# Patient Record
Sex: MALE | Race: WHITE | NOT HISPANIC OR LATINO | URBAN - METROPOLITAN AREA
[De-identification: names, ages, dates, MRNs, and addresses within clinical notes are randomized per-mention and may not be internally consistent; named-entity substitution may affect disease eponyms.]

---

## 2020-04-02 DIAGNOSIS — I15.9 SECONDARY HYPERTENSION: Primary | ICD-10-CM

## 2020-04-02 RX ORDER — HYDROCHLOROTHIAZIDE 25 MG/1
25 TABLET ORAL DAILY
Qty: 30 TABLET | Refills: 5 | Status: SHIPPED | OUTPATIENT
Start: 2020-04-02 | End: 2020-06-02 | Stop reason: SDUPTHER

## 2020-04-02 RX ORDER — HYDROCHLOROTHIAZIDE 25 MG/1
25 TABLET ORAL DAILY
COMMUNITY
End: 2020-04-02 | Stop reason: SDUPTHER

## 2020-04-06 ENCOUNTER — TELEPHONE (OUTPATIENT)
Dept: FAMILY MEDICINE CLINIC | Facility: CLINIC | Age: 70
End: 2020-04-06

## 2020-06-02 ENCOUNTER — TELEPHONE (OUTPATIENT)
Dept: FAMILY MEDICINE CLINIC | Facility: CLINIC | Age: 70
End: 2020-06-02

## 2020-06-02 DIAGNOSIS — I15.9 SECONDARY HYPERTENSION: ICD-10-CM

## 2020-06-02 DIAGNOSIS — E78.5 HYPERLIPIDEMIA, UNSPECIFIED HYPERLIPIDEMIA TYPE: Primary | ICD-10-CM

## 2020-06-02 RX ORDER — SIMVASTATIN 80 MG
TABLET ORAL
COMMUNITY
Start: 2020-05-06 | End: 2020-06-02 | Stop reason: SDUPTHER

## 2020-06-02 RX ORDER — HYDROCHLOROTHIAZIDE 25 MG/1
25 TABLET ORAL DAILY
Qty: 30 TABLET | Refills: 3 | Status: SHIPPED | OUTPATIENT
Start: 2020-06-02 | End: 2020-10-05 | Stop reason: SDUPTHER

## 2020-06-02 RX ORDER — SIMVASTATIN 80 MG
80 TABLET ORAL
Qty: 30 TABLET | Refills: 3 | Status: SHIPPED | OUTPATIENT
Start: 2020-06-02 | End: 2020-10-05 | Stop reason: SDUPTHER

## 2020-06-16 DIAGNOSIS — I10 ESSENTIAL HYPERTENSION: Primary | ICD-10-CM

## 2020-06-16 RX ORDER — AMLODIPINE BESYLATE 10 MG/1
10 TABLET ORAL DAILY
Qty: 30 TABLET | Refills: 3 | Status: SHIPPED | OUTPATIENT
Start: 2020-06-16 | End: 2020-10-05 | Stop reason: SDUPTHER

## 2020-06-16 RX ORDER — AMLODIPINE BESYLATE 10 MG/1
1 TABLET ORAL DAILY
COMMUNITY
Start: 2020-05-19 | End: 2020-06-16 | Stop reason: SDUPTHER

## 2020-06-16 RX ORDER — BENAZEPRIL HYDROCHLORIDE 40 MG/1
1 TABLET, FILM COATED ORAL DAILY
COMMUNITY
Start: 2020-05-19 | End: 2020-06-16 | Stop reason: SDUPTHER

## 2020-06-16 RX ORDER — BENAZEPRIL HYDROCHLORIDE 40 MG/1
40 TABLET, FILM COATED ORAL DAILY
Qty: 30 TABLET | Refills: 3 | Status: SHIPPED | OUTPATIENT
Start: 2020-06-16 | End: 2020-11-23 | Stop reason: SDUPTHER

## 2020-10-05 ENCOUNTER — TELEPHONE (OUTPATIENT)
Dept: FAMILY MEDICINE CLINIC | Facility: CLINIC | Age: 70
End: 2020-10-05

## 2020-10-05 DIAGNOSIS — E78.5 HYPERLIPIDEMIA, UNSPECIFIED HYPERLIPIDEMIA TYPE: ICD-10-CM

## 2020-10-05 DIAGNOSIS — I15.9 SECONDARY HYPERTENSION: ICD-10-CM

## 2020-10-05 DIAGNOSIS — I10 ESSENTIAL HYPERTENSION: ICD-10-CM

## 2020-10-05 RX ORDER — HYDROCHLOROTHIAZIDE 25 MG/1
25 TABLET ORAL DAILY
Qty: 30 TABLET | Refills: 5 | Status: SHIPPED | OUTPATIENT
Start: 2020-10-05 | End: 2021-05-04 | Stop reason: SDUPTHER

## 2020-10-05 RX ORDER — AMLODIPINE BESYLATE 10 MG/1
10 TABLET ORAL DAILY
Qty: 30 TABLET | Refills: 5 | Status: SHIPPED | OUTPATIENT
Start: 2020-10-05 | End: 2021-05-04 | Stop reason: SDUPTHER

## 2020-10-05 RX ORDER — SIMVASTATIN 80 MG
80 TABLET ORAL
Qty: 30 TABLET | Refills: 5 | Status: SHIPPED | OUTPATIENT
Start: 2020-10-05 | End: 2021-05-04 | Stop reason: SDUPTHER

## 2020-11-23 ENCOUNTER — TELEPHONE (OUTPATIENT)
Dept: FAMILY MEDICINE CLINIC | Facility: CLINIC | Age: 70
End: 2020-11-23

## 2020-11-23 DIAGNOSIS — I10 ESSENTIAL HYPERTENSION: ICD-10-CM

## 2020-11-23 DIAGNOSIS — E11.9 DIABETES MELLITUS WITHOUT COMPLICATION (HCC): Primary | ICD-10-CM

## 2020-11-23 RX ORDER — BENAZEPRIL HYDROCHLORIDE 40 MG/1
40 TABLET, FILM COATED ORAL DAILY
Qty: 30 TABLET | Refills: 3 | Status: SHIPPED | OUTPATIENT
Start: 2020-11-23 | End: 2021-04-19 | Stop reason: SDUPTHER

## 2021-01-23 PROBLEM — E11.9 DIABETES MELLITUS WITHOUT COMPLICATION (HCC): Status: ACTIVE | Noted: 2021-01-23

## 2021-01-23 PROBLEM — F41.1 GAD (GENERALIZED ANXIETY DISORDER): Status: ACTIVE | Noted: 2021-01-23

## 2021-01-23 PROBLEM — G47.33 OSA (OBSTRUCTIVE SLEEP APNEA): Status: ACTIVE | Noted: 2021-01-23

## 2021-01-23 PROBLEM — E66.9 OBESITY (BMI 30-39.9): Status: ACTIVE | Noted: 2021-01-23

## 2021-01-23 PROBLEM — E78.00 HYPERCHOLESTEROLEMIA: Status: ACTIVE | Noted: 2021-01-23

## 2021-01-23 PROBLEM — I10 ESSENTIAL HYPERTENSION: Status: ACTIVE | Noted: 2021-01-23

## 2021-01-29 ENCOUNTER — TELEMEDICINE (OUTPATIENT)
Dept: FAMILY MEDICINE CLINIC | Facility: CLINIC | Age: 71
End: 2021-01-29
Payer: MEDICARE

## 2021-01-29 VITALS — WEIGHT: 255 LBS | BODY MASS INDEX: 33.8 KG/M2 | HEIGHT: 73 IN

## 2021-01-29 DIAGNOSIS — I10 ESSENTIAL HYPERTENSION: Primary | ICD-10-CM

## 2021-01-29 DIAGNOSIS — E11.9 DIABETES MELLITUS WITHOUT COMPLICATION (HCC): ICD-10-CM

## 2021-01-29 DIAGNOSIS — E66.9 OBESITY (BMI 30-39.9): ICD-10-CM

## 2021-01-29 DIAGNOSIS — E78.00 HYPERCHOLESTEROLEMIA: ICD-10-CM

## 2021-01-29 DIAGNOSIS — Z12.11 SCREENING FOR COLON CANCER: ICD-10-CM

## 2021-01-29 DIAGNOSIS — Z12.5 PROSTATE CANCER SCREENING: ICD-10-CM

## 2021-01-29 DIAGNOSIS — F41.1 GAD (GENERALIZED ANXIETY DISORDER): ICD-10-CM

## 2021-01-29 DIAGNOSIS — Z00.00 MEDICARE ANNUAL WELLNESS VISIT, SUBSEQUENT: ICD-10-CM

## 2021-01-29 PROCEDURE — 99214 OFFICE O/P EST MOD 30 MIN: CPT | Performed by: FAMILY MEDICINE

## 2021-01-29 PROCEDURE — 3008F BODY MASS INDEX DOCD: CPT | Performed by: FAMILY MEDICINE

## 2021-01-29 PROCEDURE — 1101F PT FALLS ASSESS-DOCD LE1/YR: CPT | Performed by: FAMILY MEDICINE

## 2021-01-29 PROCEDURE — 1160F RVW MEDS BY RX/DR IN RCRD: CPT | Performed by: FAMILY MEDICINE

## 2021-01-29 PROCEDURE — 1125F AMNT PAIN NOTED PAIN PRSNT: CPT | Performed by: FAMILY MEDICINE

## 2021-01-29 PROCEDURE — 3725F SCREEN DEPRESSION PERFORMED: CPT | Performed by: FAMILY MEDICINE

## 2021-01-29 PROCEDURE — G0439 PPPS, SUBSEQ VISIT: HCPCS | Performed by: FAMILY MEDICINE

## 2021-01-29 PROCEDURE — 1170F FXNL STATUS ASSESSED: CPT | Performed by: FAMILY MEDICINE

## 2021-01-29 PROCEDURE — 1123F ACP DISCUSS/DSCN MKR DOCD: CPT | Performed by: FAMILY MEDICINE

## 2021-01-29 PROCEDURE — 1036F TOBACCO NON-USER: CPT | Performed by: FAMILY MEDICINE

## 2021-01-29 PROCEDURE — 3288F FALL RISK ASSESSMENT DOCD: CPT | Performed by: FAMILY MEDICINE

## 2021-01-29 NOTE — PROGRESS NOTES
Virtual Regular Visit      Assessment/Plan:    Problem List Items Addressed This Visit        Endocrine    Diabetes mellitus without complication ([address] )     Stressed low carb diet, exercise, and wt loss  Cont med and will recheck labs  Pt had eye exam in Dec 2020 by Dr Jose J Harrington  No numbness in feet  No results found for: HGBA1C         Relevant Orders    Hemoglobin A1C       Cardiovascular and Mediastinum    Essential hypertension - Primary     Pt needs to check BP on regular basis  Inc exercise and cont low Na diet  Cont current meds for now  Relevant Orders    Comprehensive metabolic panel    CBC and differential       Other    Obesity (BMI 30-39  9)     Discussed inc exercise and smaller portions to lose wt  Hypercholesterolemia     Recheck lipids and LFTs  Cont simvastatin 80 mg qhs  Relevant Orders    Lipid panel    Comprehensive metabolic panel    MARYAM (generalized anxiety disorder)     Stress level ok  Not on meds at present  Other Visit Diagnoses     Prostate cancer screening        Relevant Orders    PSA, Total Screen    Screening for colon cancer        Relevant Orders    Cologuard          BMI Counseling: Body mass index is 33 64 kg/m²  The BMI is above normal  Nutrition recommendations include decreasing portion sizes, encouraging healthy choices of fruits and vegetables, consuming healthier snacks and moderation in carbohydrate intake  Exercise recommendations include exercising 3-5 times per week  No pharmacotherapy was ordered  Reason for visit is   Chief Complaint   Patient presents with    Hypertension    Diabetes    Medicare Wellness Visit    Virtual Regular Visit        Encounter provider Aysha Hernandez MD    Provider located at [address] [place] [id number] [phone]      Recent Visits  No visits were found meeting these conditions     Showing recent visits within past 7 days and meeting all other requirements     Today's Visits  Date Type Provider Dept   01/29/21 William Bennett MD [address] [address] today's visits and meeting all other requirements     Future Appointments  No visits were found meeting these conditions  Showing future appointments within next 150 days and meeting all other requirements        The patient was identified by name and date of birth  Ofe Guido was informed that this is a telemedicine visit and that the visit is being conducted through [facility] and patient was informed that this is a secure, HIPAA-compliant platform  He agrees to proceed     My office door was closed  No one else was in the room  He acknowledged consent and understanding of privacy and security of the video platform  The patient has agreed to participate and understands they can discontinue the visit at any time  Patient is aware this is a billable service  Subjective  Ofe Guido is a 69 y o  male    Pt for f/u HTN, HL, DM, MARYAM, Obesity  Pt doing well  No cp/sob  No headaches  No abd pain  No regular exercise  Tries to watch diet  Not checking BP or sugars at home  Last alberto exam was in Dec with Dr Kiki Soto  Sees him every 6 mths  No numbness in feet  No new c/o          Past Medical History:   Diagnosis Date    Diabetes mellitus ([address] )     Hypertension     Low back pain     Sleep disorder        Past Surgical History:   Procedure Laterality Date    TONSILLECTOMY         Current Outpatient Medications   Medication Sig Dispense Refill    amLODIPine (NORVASC) 10 mg tablet Take 1 tablet (10 mg total) by mouth daily 30 tablet 5    benazepril (LOTENSIN) 40 MG tablet Take 1 tablet (40 mg total) by mouth daily 30 tablet 3    hydrochlorothiazide (HYDRODIURIL) 25 mg tablet Take 1 tablet (25 mg total) by mouth daily 30 tablet 5    metFORMIN (GLUCOPHAGE) 500 mg tablet Take 2 tablets (1,000 mg total) by mouth 2 (two) times a day with meals 120 tablet 3    simvastatin (ZOCOR) 80 mg tablet Take 1 tablet (80 mg total) by mouth daily at bedtime 30 tablet 5     No current facility-administered medications for this visit  No Known Allergies    Review of Systems   Constitutional: Negative for activity change, appetite change, chills, fatigue, fever and unexpected weight change  HENT: Negative for congestion, ear pain, postnasal drip, rhinorrhea, sinus pressure, sinus pain, sore throat and trouble swallowing  Eyes: Negative for visual disturbance  Respiratory: Negative for cough, chest tightness, shortness of breath and wheezing  Cardiovascular: Negative for chest pain and palpitations  Gastrointestinal: Negative for abdominal pain, constipation, diarrhea, nausea and vomiting  Endocrine: Negative for polydipsia, polyphagia and polyuria  Genitourinary: Negative for frequency  Musculoskeletal: Negative for arthralgias  Skin: Negative for rash and wound  Neurological: Negative for dizziness, tremors, light-headedness, numbness and headaches  Psychiatric/Behavioral: Negative for agitation, decreased concentration, dysphoric mood, self-injury, sleep disturbance and suicidal ideas  The patient is not nervous/anxious  Video Exam    Vitals:    01/29/21 0902   Weight: 116 kg (255 lb)   Height: 6' 1" (1 854 m)       Physical Exam  Vitals signs and nursing note reviewed  Constitutional:       General: He is not in acute distress  Appearance: Normal appearance  He is well-developed  He is obese  He is not ill-appearing or toxic-appearing  HENT:      Head: Normocephalic and atraumatic  Nose: No congestion or rhinorrhea  Cardiovascular:      Rate and Rhythm: Normal rate  Pulmonary:      Effort: Pulmonary effort is normal  No respiratory distress  Neurological:      Mental Status: He is alert and oriented to person, place, and time     Psychiatric:         Mood and Affect: Mood normal          Behavior: Behavior normal          Thought Content: Thought content normal          Judgment: Judgment normal           I spent 20 minutes directly with the patient during this visit      VIRTUAL VISIT Juhi Willis acknowledges that he has consented to an online visit or consultation  He understands that the online visit is based solely on information provided by him, and that, in the absence of a face-to-face physical evaluation by the physician, the diagnosis he receives is both limited and provisional in terms of accuracy and completeness  This is not intended to replace a full medical face-to-face evaluation by the physician  Roberta Post understands and accepts these terms

## 2021-01-29 NOTE — PATIENT INSTRUCTIONS

## 2021-01-29 NOTE — ASSESSMENT & PLAN NOTE
Pt needs to check BP on regular basis  Inc exercise and cont low Na diet  Cont current meds for now

## 2021-01-29 NOTE — PROGRESS NOTES
BMI Counseling: Body mass index is 33 64 kg/m²  The BMI is above normal  Nutrition recommendations include decreasing portion sizes, encouraging healthy choices of fruits and vegetables, consuming healthier snacks and moderation in carbohydrate intake  Exercise recommendations include exercising 3-5 times per week  No pharmacotherapy was ordered  Assessment/Plan:         Problem List Items Addressed This Visit        Endocrine    Diabetes mellitus without complication ([facility] [address] )     Stressed low carb diet, exercise, and wt loss  Cont med and will recheck labs  Pt had eye exam in Dec 2020 by Dr Muna Molina  No numbness in feet  No results found for: HGBA1C         Relevant Orders    Hemoglobin A1C       Cardiovascular and Mediastinum    Essential hypertension - Primary     Pt needs to check BP on regular basis  Inc exercise and cont low Na diet  Cont current meds for now  Relevant Orders    Comprehensive metabolic panel    CBC and differential       Other    Obesity (BMI 30-39  9)     Discussed inc exercise and smaller portions to lose wt  Medicare annual wellness visit, subsequent     Wellness exam done  Hypercholesterolemia     Recheck lipids and LFTs  Cont simvastatin 80 mg qhs  Relevant Orders    Lipid panel    Comprehensive metabolic panel    MARYAM (generalized anxiety disorder)     Stress level ok  Not on meds at present  Other Visit Diagnoses     Prostate cancer screening        Relevant Orders    PSA, Total Screen    Screening for colon cancer        Relevant Orders    Cologuard            Subjective:      Patient ID: Eddie Durham is a 69 y o  male  Pt for f/u HTN, HL, DM, MARYAM, Obesity  Pt doing well  No cp/sob  No headaches  No abd pain  No regular exercise  Pt not checking sugars or BP at home  Tries to watch carb intake  Had eye exam by Dr Muna Molina in Dec 2020 and was ok  No numbness of feet  Mood has been ok and wt is about the same  No new c/o   Due for labs and wellness exam       The following portions of the patient's history were reviewed and updated as appropriate:   Past Medical History:  He has a past medical history of Diabetes mellitus ([address] ), Hypertension, Low back pain, and Sleep disorder  ,  _______________________________________________________________________  Medical Problems:  does not have any pertinent problems on file ,  _______________________________________________________________________  Past Surgical History:   has a past surgical history that includes Tonsillectomy  ,  _______________________________________________________________________  Family History:  family history includes No Known Problems in his father and mother ,  _______________________________________________________________________  Social History:   reports that he has never smoked  He has never used smokeless tobacco  He reports previous alcohol use  He reports that he does not use drugs  ,  _______________________________________________________________________  Allergies:  has No Known Allergies     _______________________________________________________________________  Current Outpatient Medications   Medication Sig Dispense Refill    amLODIPine (NORVASC) 10 mg tablet Take 1 tablet (10 mg total) by mouth daily 30 tablet 5    benazepril (LOTENSIN) 40 MG tablet Take 1 tablet (40 mg total) by mouth daily 30 tablet 3    hydrochlorothiazide (HYDRODIURIL) 25 mg tablet Take 1 tablet (25 mg total) by mouth daily 30 tablet 5    metFORMIN (GLUCOPHAGE) 500 mg tablet Take 2 tablets (1,000 mg total) by mouth 2 (two) times a day with meals 120 tablet 3    simvastatin (ZOCOR) 80 mg tablet Take 1 tablet (80 mg total) by mouth daily at bedtime 30 tablet 5     No current facility-administered medications for this visit       _______________________________________________________________________  Review of Systems   Constitutional: Negative for activity change, appetite change, fatigue and unexpected weight change  Eyes: Negative for visual disturbance  Respiratory: Negative for chest tightness and shortness of breath  Cardiovascular: Negative for chest pain and palpitations  Gastrointestinal: Negative for abdominal pain, constipation, diarrhea, nausea and vomiting  Endocrine: Negative for polydipsia, polyphagia and polyuria  Genitourinary: Negative for frequency  Musculoskeletal: Negative for arthralgias  Skin: Negative for rash and wound  Neurological: Negative for dizziness, tremors, light-headedness, numbness and headaches  Psychiatric/Behavioral: Negative for agitation, decreased concentration, dysphoric mood, self-injury, sleep disturbance and suicidal ideas  The patient is not nervous/anxious  Objective:  Vitals:    01/29/21 0902   Weight: 116 kg (255 lb)   Height: 6' 1" (1 854 m)     Body mass index is 33 64 kg/m²  Physical Exam  Vitals signs and nursing note reviewed  Constitutional:       General: He is not in acute distress  Appearance: Normal appearance  He is well-developed  He is obese  He is not ill-appearing or toxic-appearing  HENT:      Head: Normocephalic and atraumatic  Cardiovascular:      Rate and Rhythm: Normal rate  Pulmonary:      Effort: Pulmonary effort is normal  No respiratory distress  Neurological:      Mental Status: He is alert and oriented to person, place, and time  Psychiatric:         Mood and Affect: Mood normal          Behavior: Behavior normal          Thought Content:  Thought content normal          Judgment: Judgment normal

## 2021-01-29 NOTE — PROGRESS NOTES
Assessment and Plan:     Problem List Items Addressed This Visit        Endocrine    Diabetes mellitus without complication ([address] )     Stressed low carb diet, exercise, and wt loss  Cont med and will recheck labs  Pt had eye exam in Dec 2020 by Dr Wilmer Carlton  No numbness in feet  No results found for: HGBA1C         Relevant Orders    Hemoglobin A1C       Cardiovascular and Mediastinum    Essential hypertension - Primary     Pt needs to check BP on regular basis  Inc exercise and cont low Na diet  Cont current meds for now  Relevant Orders    Comprehensive metabolic panel    CBC and differential       Other    Obesity (BMI 30-39  9)     Discussed inc exercise and smaller portions to lose wt  Medicare annual wellness visit, subsequent     Wellness exam done  Hypercholesterolemia     Recheck lipids and LFTs  Cont simvastatin 80 mg qhs  Relevant Orders    Lipid panel    Comprehensive metabolic panel    MARYAM (generalized anxiety disorder)     Stress level ok  Not on meds at present  Other Visit Diagnoses     Prostate cancer screening        Relevant Orders    PSA, Total Screen    Screening for colon cancer        Relevant Orders    Cologuard        BMI Counseling: Body mass index is 33 64 kg/m²  The BMI is above normal  Nutrition recommendations include decreasing portion sizes, encouraging healthy choices of fruits and vegetables, consuming healthier snacks and moderation in carbohydrate intake  Exercise recommendations include exercising 3-5 times per week  No pharmacotherapy was ordered  Preventive health issues were discussed with patient, and age appropriate screening tests were ordered as noted in patient's After Visit Summary  Personalized health advice and appropriate referrals for health education or preventive services given if needed, as noted in patient's After Visit Summary       History of Present Illness:     Patient presents for Medicare Annual Wellness visit    Patient Care Team:  Alexandra Morales MD as PCP - General (Family Medicine)     Problem List:     Patient Active Problem List   Diagnosis    Essential hypertension    Diabetes mellitus without complication [address] [address] )    Hypercholesterolemia    MARYAM (generalized anxiety disorder)    RICHARD (obstructive sleep apnea)    Obesity (BMI 30-39  9)    Medicare annual wellness visit, subsequent      Past Medical and Surgical History:     Past Medical History:   Diagnosis Date    Diabetes mellitus ([facility] [address] )     Hypertension     Low back pain     Sleep disorder      Past Surgical History:   Procedure Laterality Date    TONSILLECTOMY        Family History:     Family History   Problem Relation Age of Onset    No Known Problems Mother     No Known Problems Father       Social History:        Social History     Socioeconomic History    Marital status: /Civil Union     Spouse name: None    Number of children: None    Years of education: None    Highest education level: None   Occupational History    None   Social Needs    Financial resource strain: None    Food insecurity     Worry: None     Inability: None    Transportation needs     Medical: None     Non-medical: None   Tobacco Use    Smoking status: Never Smoker    Smokeless tobacco: Never Used   Substance and Sexual Activity    Alcohol use: Not Currently    Drug use: Never    Sexual activity: None   Lifestyle    Physical activity     Days per week: None     Minutes per session: None    Stress: None   Relationships    Social connections     Talks on phone: None     Gets together: None     Attends Gnosticist service: None     Active member of club or organization: None     Attends meetings of clubs or organizations: None     Relationship status: None    Intimate partner violence     Fear of current or ex partner: None     Emotionally abused: None     Physically abused: None     Forced sexual activity: None   Other Topics Concern    None Social History Narrative    Most recent tobacco use screenin2019      Medications and Allergies:     Current Outpatient Medications   Medication Sig Dispense Refill    amLODIPine (NORVASC) 10 mg tablet Take 1 tablet (10 mg total) by mouth daily 30 tablet 5    benazepril (LOTENSIN) 40 MG tablet Take 1 tablet (40 mg total) by mouth daily 30 tablet 3    hydrochlorothiazide (HYDRODIURIL) 25 mg tablet Take 1 tablet (25 mg total) by mouth daily 30 tablet 5    metFORMIN (GLUCOPHAGE) 500 mg tablet Take 2 tablets (1,000 mg total) by mouth 2 (two) times a day with meals 120 tablet 3    simvastatin (ZOCOR) 80 mg tablet Take 1 tablet (80 mg total) by mouth daily at bedtime 30 tablet 5     No current facility-administered medications for this visit  No Known Allergies   Immunizations:     Immunization History   Administered Date(s) Administered    Pneumococcal Conjugate 13-Valent 10/19/2015    Pneumococcal Polysaccharide PPV23 10/14/2014    Tdap 2012      Health Maintenance:         Topic Date Due    Hepatitis C Screening  1950    Colorectal Cancer Screening  1999         Topic Date Due    Influenza Vaccine (1) 2020      Medicare Health Risk Assessment:     Ht 6' 1" (1 854 m)   Wt 116 kg (255 lb)   BMI 33 64 kg/m²      [facility] is here for his Subsequent Wellness visit  Health Risk Assessment:   Patient rates overall health as good  Patient feels that their physical health rating is same  Eyesight was rated as same  Hearing was rated as same  Patient feels that their emotional and mental health rating is same  Pain experienced in the last 7 days has been some  Patient's pain rating has been 3/10  Patient states that he has experienced no weight loss or gain in last 6 months  Depression Screening:   PHQ-2 Score: 2      Fall Risk Screening:    In the past year, patient has experienced: no history of falling in past year      Home Safety:  Patient does not have trouble with stairs inside or outside of their home  Patient has working smoke alarms and has working carbon monoxide detector  Home safety hazards include: none  Nutrition:   Current diet is Diabetic  Medications:   Patient is not currently taking any over-the-counter supplements  Patient is able to manage medications  Activities of Daily Living (ADLs)/Instrumental Activities of Daily Living (IADLs):   Walk and transfer into and out of bed and chair?: Yes  Dress and groom yourself?: Yes    Bathe or shower yourself?: Yes    Feed yourself?  Yes  Do your laundry/housekeeping?: Yes  Manage your money, pay your bills and track your expenses?: Yes  Make your own meals?: Yes    Do your own shopping?: Yes    Previous Hospitalizations:   Any hospitalizations or ED visits within the last 12 months?: No      Advance Care Planning:   Living will: No    Durable POA for healthcare: No    Advanced directive: No      PREVENTIVE SCREENINGS      Cardiovascular Screening:    General: Screening Not Indicated and History Lipid Disorder      Diabetes Screening:     General: Screening Not Indicated and History Diabetes      Colorectal Cancer Screening:       Due for: Cologuard      Prostate Cancer Screening:      Due for: PSA      Osteoporosis Screening:    General: Screening Not Indicated      Abdominal Aortic Aneurysm (AAA) Screening:    Risk factors include: age between 73-67 yo        General: Screening Not Indicated      Lung Cancer Screening:     General: Screening Not Indicated      Hepatitis C Screening:    General: Risks and Benefits Discussed and Patient Declines    Hep C Screening Accepted: No       Miguelina Sotomayor MD

## 2021-01-29 NOTE — ASSESSMENT & PLAN NOTE
Stressed low carb diet, exercise, and wt loss  Cont med and will recheck labs  Pt had eye exam in Dec 2020 by Dr Williams Ganser  No numbness in feet       No results found for: HGBA1C

## 2021-04-19 ENCOUNTER — TELEPHONE (OUTPATIENT)
Dept: FAMILY MEDICINE CLINIC | Facility: CLINIC | Age: 71
End: 2021-04-19

## 2021-04-19 DIAGNOSIS — I10 ESSENTIAL HYPERTENSION: ICD-10-CM

## 2021-04-19 DIAGNOSIS — E11.9 DIABETES MELLITUS WITHOUT COMPLICATION (HCC): ICD-10-CM

## 2021-04-19 RX ORDER — BENAZEPRIL HYDROCHLORIDE 40 MG/1
40 TABLET, FILM COATED ORAL DAILY
Qty: 30 TABLET | Refills: 1 | Status: SHIPPED | OUTPATIENT
Start: 2021-04-19 | End: 2021-06-05

## 2021-04-19 NOTE — TELEPHONE ENCOUNTER
metFORMIN (GLUCOPHAGE) 500 mg tablet  Take 2 tablets (1,000 mg total) by mouth 2 (two) times a day with meals   benazepril (LOTENSIN) 40 MG tablet Take 1 tablet (40 mg total) by mouth daily QTY 30 both medications sent to [address] [place] [address]

## 2021-05-04 ENCOUNTER — TRANSCRIBE ORDERS (OUTPATIENT)
Dept: FAMILY MEDICINE CLINIC | Facility: CLINIC | Age: 71
End: 2021-05-04

## 2021-05-04 DIAGNOSIS — I15.9 SECONDARY HYPERTENSION: ICD-10-CM

## 2021-05-04 DIAGNOSIS — I10 ESSENTIAL HYPERTENSION: ICD-10-CM

## 2021-05-04 DIAGNOSIS — E78.5 HYPERLIPIDEMIA, UNSPECIFIED HYPERLIPIDEMIA TYPE: ICD-10-CM

## 2021-05-04 RX ORDER — AMLODIPINE BESYLATE 10 MG/1
10 TABLET ORAL DAILY
Qty: 30 TABLET | Refills: 2 | Status: SHIPPED | OUTPATIENT
Start: 2021-05-04 | End: 2021-07-25

## 2021-05-04 RX ORDER — SIMVASTATIN 80 MG
80 TABLET ORAL
Qty: 30 TABLET | Refills: 2 | Status: SHIPPED | OUTPATIENT
Start: 2021-05-04 | End: 2021-07-25

## 2021-05-04 RX ORDER — HYDROCHLOROTHIAZIDE 25 MG/1
25 TABLET ORAL DAILY
Qty: 30 TABLET | Refills: 2 | Status: SHIPPED | OUTPATIENT
Start: 2021-05-04 | End: 2021-07-25

## 2021-05-04 NOTE — TELEPHONE ENCOUNTER
Needs a refill for Simvastatin 80mg, 1x a day;  Amlodipine 10mg, 1x a day;   Hydrocholthiazide 25mg, 1x a day;    (30 day)    pharmacy - [facility] , [place]    Patients ph # [phone]

## 2021-06-05 DIAGNOSIS — E11.9 DIABETES MELLITUS WITHOUT COMPLICATION (HCC): ICD-10-CM

## 2021-06-05 DIAGNOSIS — I10 ESSENTIAL HYPERTENSION: ICD-10-CM

## 2021-06-05 RX ORDER — BENAZEPRIL HYDROCHLORIDE 40 MG/1
TABLET, FILM COATED ORAL
Qty: 30 TABLET | Refills: 1 | Status: SHIPPED | OUTPATIENT
Start: 2021-06-05 | End: 2021-06-21 | Stop reason: SDUPTHER

## 2021-06-21 DIAGNOSIS — E11.9 DIABETES MELLITUS WITHOUT COMPLICATION (HCC): ICD-10-CM

## 2021-06-21 DIAGNOSIS — I10 ESSENTIAL HYPERTENSION: ICD-10-CM

## 2021-06-21 RX ORDER — BENAZEPRIL HYDROCHLORIDE 40 MG/1
40 TABLET, FILM COATED ORAL DAILY
Qty: 30 TABLET | Refills: 1 | Status: SHIPPED | OUTPATIENT
Start: 2021-06-21 | End: 2021-08-19 | Stop reason: SDUPTHER

## 2021-08-19 DIAGNOSIS — E11.9 DIABETES MELLITUS WITHOUT COMPLICATION (HCC): ICD-10-CM

## 2021-08-19 DIAGNOSIS — I10 ESSENTIAL HYPERTENSION: ICD-10-CM

## 2021-08-19 RX ORDER — BENAZEPRIL HYDROCHLORIDE 40 MG/1
40 TABLET, FILM COATED ORAL DAILY
Qty: 30 TABLET | Refills: 1 | Status: SHIPPED | OUTPATIENT
Start: 2021-08-19 | End: 2021-08-30 | Stop reason: SDUPTHER

## 2021-08-30 ENCOUNTER — OFFICE VISIT (OUTPATIENT)
Dept: FAMILY MEDICINE CLINIC | Facility: CLINIC | Age: 71
End: 2021-08-30
Payer: MEDICARE

## 2021-08-30 VITALS
RESPIRATION RATE: 16 BRPM | OXYGEN SATURATION: 97 % | HEIGHT: 73 IN | BODY MASS INDEX: 32.2 KG/M2 | WEIGHT: 243 LBS | SYSTOLIC BLOOD PRESSURE: 132 MMHG | HEART RATE: 87 BPM | TEMPERATURE: 97.2 F | DIASTOLIC BLOOD PRESSURE: 72 MMHG

## 2021-08-30 DIAGNOSIS — I15.9 SECONDARY HYPERTENSION: ICD-10-CM

## 2021-08-30 DIAGNOSIS — E78.00 HYPERCHOLESTEROLEMIA: ICD-10-CM

## 2021-08-30 DIAGNOSIS — E11.9 DIABETES MELLITUS WITHOUT COMPLICATION (HCC): ICD-10-CM

## 2021-08-30 DIAGNOSIS — Z12.5 PROSTATE CANCER SCREENING: ICD-10-CM

## 2021-08-30 DIAGNOSIS — R01.1 SYSTOLIC MURMUR: ICD-10-CM

## 2021-08-30 DIAGNOSIS — E66.9 OBESITY (BMI 30-39.9): ICD-10-CM

## 2021-08-30 DIAGNOSIS — I10 ESSENTIAL HYPERTENSION: Primary | ICD-10-CM

## 2021-08-30 DIAGNOSIS — E78.5 HYPERLIPIDEMIA, UNSPECIFIED HYPERLIPIDEMIA TYPE: ICD-10-CM

## 2021-08-30 LAB — SL AMB POCT HEMOGLOBIN AIC: 8.6 (ref ?–6.5)

## 2021-08-30 PROCEDURE — 83036 HEMOGLOBIN GLYCOSYLATED A1C: CPT | Performed by: FAMILY MEDICINE

## 2021-08-30 PROCEDURE — 99214 OFFICE O/P EST MOD 30 MIN: CPT | Performed by: FAMILY MEDICINE

## 2021-08-30 RX ORDER — AMLODIPINE BESYLATE 10 MG/1
10 TABLET ORAL DAILY
Qty: 30 TABLET | Refills: 5 | Status: SHIPPED | OUTPATIENT
Start: 2021-08-30 | End: 2022-01-03 | Stop reason: SDUPTHER

## 2021-08-30 RX ORDER — SIMVASTATIN 80 MG
80 TABLET ORAL
Qty: 30 TABLET | Refills: 5 | Status: SHIPPED | OUTPATIENT
Start: 2021-08-30 | End: 2022-01-03 | Stop reason: SDUPTHER

## 2021-08-30 RX ORDER — HYDROCHLOROTHIAZIDE 25 MG/1
25 TABLET ORAL DAILY
Qty: 30 TABLET | Refills: 5 | Status: SHIPPED | OUTPATIENT
Start: 2021-08-30 | End: 2022-01-03 | Stop reason: SDUPTHER

## 2021-08-30 RX ORDER — EMPAGLIFLOZIN 10 MG/1
10 TABLET, FILM COATED ORAL EVERY MORNING
Qty: 30 TABLET | Refills: 5 | Status: SHIPPED | OUTPATIENT
Start: 2021-08-30 | End: 2021-10-04

## 2021-08-30 RX ORDER — BENAZEPRIL HYDROCHLORIDE 40 MG/1
40 TABLET, FILM COATED ORAL DAILY
Qty: 30 TABLET | Refills: 5 | Status: SHIPPED | OUTPATIENT
Start: 2021-08-30 | End: 2021-10-20 | Stop reason: SDUPTHER

## 2021-08-30 NOTE — ASSESSMENT & PLAN NOTE
Recheck lipids and LFTs  Continue simvastatin 80 mg qhs  Advised pt to follow a low cholesterol diet and to exercise on a regular basis  Refill given

## 2021-08-30 NOTE — PROGRESS NOTES
Assessment/Plan:         Problem List Items Addressed This Visit        Endocrine    Diabetes mellitus without complication (HCC)     A1T done today and was 8 6  Continue metformin 1000 mg bid and add jardiance 10 mg qd  If too expensive, will change to glipizide ER 5 mg qd  Advised pt to follow a low carb diet and to exercise on a regular basis  Pt had eye exam in Dec 2020 by Dr Jerris Osgood  Foot exam done today  No results found for: HGBA1C         Relevant Medications    metFORMIN (GLUCOPHAGE) 500 mg tablet    Empagliflozin (Jardiance) 10 MG TABS    Other Relevant Orders    POCT hemoglobin A1c       Cardiovascular and Mediastinum    Essential hypertension - Primary     Continue amlodipine 10 mg qd, benazepril 40 mg qd, and HCTZ 25 mg qd  Pt advised to continue low Na diet and to exercise on a regular basis  Refills given  Relevant Medications    amLODIPine (NORVASC) 10 mg tablet    benazepril (LOTENSIN) 40 MG tablet    hydrochlorothiazide (HYDRODIURIL) 25 mg tablet    Other Relevant Orders    CBC and differential    Comprehensive metabolic panel       Other    Systolic murmur     Will check echo when pt ready  Obesity (BMI 30-39  9)     Discussed smaller portions, healthy snacks, and regular exercise  Hypercholesterolemia     Recheck lipids and LFTs  Continue simvastatin 80 mg qhs  Advised pt to follow a low cholesterol diet and to exercise on a regular basis  Refill given            Relevant Medications    simvastatin (ZOCOR) 80 mg tablet    Other Relevant Orders    Lipid panel    Comprehensive metabolic panel      Other Visit Diagnoses     Secondary hypertension        Relevant Medications    amLODIPine (NORVASC) 10 mg tablet    benazepril (LOTENSIN) 40 MG tablet    hydrochlorothiazide (HYDRODIURIL) 25 mg tablet    Hyperlipidemia, unspecified hyperlipidemia type        Relevant Medications    simvastatin (ZOCOR) 80 mg tablet    Prostate cancer screening        Relevant Orders    PSA, Total Screen            Subjective:      Patient ID: Alen Mcarthur is a 66 y o  male  Pt here for f/u HTN, HL, DM, Obesity  Doing ok  No cp/sob  No headaches  No abd pain  No regular exercise but more active  Not checking BP at home  Tries to follow low carb diet  Last eye exam was in Dec 2020  No numbness in feet  Hasn't had COVID vaccines yet  No new c/o  The following portions of the patient's history were reviewed and updated as appropriate:   Past Medical History:  He has a past medical history of Diabetes mellitus ([address] ), Hypertension, Low back pain, and Sleep disorder  ,  _______________________________________________________________________  Medical Problems:  does not have any pertinent problems on file ,  _______________________________________________________________________  Past Surgical History:   has a past surgical history that includes Tonsillectomy  ,  _______________________________________________________________________  Family History:  family history includes No Known Problems in his father and mother ,  _______________________________________________________________________  Social History:   reports that he has never smoked  He has never used smokeless tobacco  He reports previous alcohol use  He reports that he does not use drugs  ,  _______________________________________________________________________  Allergies:  has No Known Allergies     _______________________________________________________________________  Current Outpatient Medications   Medication Sig Dispense Refill    amLODIPine (NORVASC) 10 mg tablet Take 1 tablet (10 mg total) by mouth daily 30 tablet 5    benazepril (LOTENSIN) 40 MG tablet Take 1 tablet (40 mg total) by mouth daily 30 tablet 5    hydrochlorothiazide (HYDRODIURIL) 25 mg tablet Take 1 tablet (25 mg total) by mouth daily 30 tablet 5    metFORMIN (GLUCOPHAGE) 500 mg tablet Take 2 tablets (1,000 mg total) by mouth 2 (two) times a day with meals 120 tablet 5    simvastatin (ZOCOR) 80 mg tablet Take 1 tablet (80 mg total) by mouth daily at bedtime 30 tablet 5    Empagliflozin (Jardiance) 10 MG TABS Take 1 tablet (10 mg total) by mouth every morning 30 tablet 5     No current facility-administered medications for this visit      _______________________________________________________________________  Review of Systems   Constitutional: Negative for fatigue and unexpected weight change  Eyes: Negative for visual disturbance  Respiratory: Negative for chest tightness and shortness of breath  Cardiovascular: Negative for chest pain and palpitations  Gastrointestinal: Negative for abdominal pain, constipation, diarrhea, nausea and vomiting  Endocrine: Negative for polydipsia, polyphagia and polyuria  Genitourinary: Negative for frequency  Musculoskeletal: Positive for myalgias  Negative for arthralgias  Skin: Negative for rash and wound  Neurological: Negative for numbness  Objective:  Vitals:    08/30/21 0933   BP: 132/72   BP Location: Left arm   Patient Position: Sitting   Cuff Size: Standard   Pulse: 87   Resp: 16   Temp: (!) 97 2 °F (36 2 °C)   TempSrc: Temporal   SpO2: 97%   Weight: 110 kg (243 lb)   Height: 6' 1" (1 854 m)     Body mass index is 32 06 kg/m²  Physical Exam  Vitals and nursing note reviewed  Constitutional:       Appearance: Normal appearance  He is well-developed  He is obese  Neck:      Thyroid: No thyromegaly  Cardiovascular:      Rate and Rhythm: Normal rate and regular rhythm  Pulses: no weak pulses     Heart sounds: Murmur heard  Pulmonary:      Effort: Pulmonary effort is normal  No respiratory distress  Breath sounds: Normal breath sounds  No wheezing  Musculoskeletal:      Cervical back: Normal range of motion and neck supple  Right lower leg: No edema  Left lower leg: No edema  Feet:      Right foot:      Skin integrity: Dry skin present   No ulcer, skin breakdown, erythema, warmth or callus  Left foot:      Skin integrity: Dry skin present  No ulcer, skin breakdown, erythema, warmth or callus  Lymphadenopathy:      Cervical: No cervical adenopathy  Neurological:      Mental Status: He is alert and oriented to person, place, and time  Cranial Nerves: No cranial nerve deficit  Psychiatric:         Mood and Affect: Mood normal          Behavior: Behavior normal          Thought Content: Thought content normal          Judgment: Judgment normal        Patient's shoes and socks removed  Right Foot/Ankle   Right Foot Inspection  Skin Exam: skin normal, skin intact and dry skin no warmth, no callus, no erythema, no maceration, no abnormal color, no pre-ulcer, no ulcer and no callus                            Sensory     Proprioception: intact     Vascular  Capillary refills: < 3 seconds      Left Foot/Ankle  Left Foot Inspection  Skin Exam: skin normal, skin intact and dry skinno warmth, no erythema, no maceration, normal color, no pre-ulcer, no ulcer and no callus                                         Sensory     Proprioception: intact    Vascular  Capillary refills: < 3 seconds    Assign Risk Category:  No deformity present; No loss of protective sensation;  No weak pulses       Risk: 0

## 2021-08-30 NOTE — ASSESSMENT & PLAN NOTE
Continue amlodipine 10 mg qd, benazepril 40 mg qd, and HCTZ 25 mg qd  Pt advised to continue low Na diet and to exercise on a regular basis  Refills given

## 2021-08-30 NOTE — ASSESSMENT & PLAN NOTE
A1C done today and was 8 6  Continue metformin 1000 mg bid and add jardiance 10 mg qd  If too expensive, will change to glipizide ER 5 mg qd  Advised pt to follow a low carb diet and to exercise on a regular basis  Pt had eye exam in Dec 2020 by Dr Lia Alegre  Foot exam done today      No results found for: HGBA1C

## 2021-10-04 DIAGNOSIS — E11.9 DIABETES MELLITUS WITHOUT COMPLICATION (HCC): Primary | ICD-10-CM

## 2021-10-04 RX ORDER — GLIPIZIDE 5 MG/1
5 TABLET, FILM COATED, EXTENDED RELEASE ORAL DAILY
Qty: 90 TABLET | Refills: 1 | Status: SHIPPED | OUTPATIENT
Start: 2021-10-04 | End: 2022-01-03 | Stop reason: ALTCHOICE

## 2021-10-04 RX ORDER — GLIPIZIDE 5 MG/1
5 TABLET, FILM COATED, EXTENDED RELEASE ORAL DAILY
COMMUNITY
End: 2021-10-04 | Stop reason: SDUPTHER

## 2021-10-20 ENCOUNTER — TELEPHONE (OUTPATIENT)
Dept: FAMILY MEDICINE CLINIC | Facility: CLINIC | Age: 71
End: 2021-10-20

## 2021-10-20 DIAGNOSIS — I10 ESSENTIAL HYPERTENSION: ICD-10-CM

## 2021-10-20 DIAGNOSIS — E11.9 DIABETES MELLITUS WITHOUT COMPLICATION (HCC): ICD-10-CM

## 2021-10-20 RX ORDER — BENAZEPRIL HYDROCHLORIDE 40 MG/1
40 TABLET, FILM COATED ORAL DAILY
Qty: 30 TABLET | Refills: 3 | Status: SHIPPED | OUTPATIENT
Start: 2021-10-20 | End: 2022-01-03 | Stop reason: SDUPTHER

## 2021-12-16 LAB
ALBUMIN SERPL-MCNC: 4.4 G/DL (ref 3.6–5.1)
ALBUMIN/GLOB SERPL: 1.7 (CALC) (ref 1–2.5)
ALP SERPL-CCNC: 61 U/L (ref 35–144)
ALT SERPL-CCNC: 10 U/L (ref 9–46)
AST SERPL-CCNC: 15 U/L (ref 10–35)
BASOPHILS # BLD AUTO: 41 CELLS/UL (ref 0–200)
BASOPHILS NFR BLD AUTO: 0.6 %
BILIRUB SERPL-MCNC: 0.6 MG/DL (ref 0.2–1.2)
BUN SERPL-MCNC: 17 MG/DL (ref 7–25)
BUN/CREAT SERPL: NORMAL (CALC) (ref 6–22)
CALCIUM SERPL-MCNC: 9.7 MG/DL (ref 8.6–10.3)
CHLORIDE SERPL-SCNC: 104 MMOL/L (ref 98–110)
CHOLEST SERPL-MCNC: 166 MG/DL
CHOLEST/HDLC SERPL: 3.3 (CALC)
CO2 SERPL-SCNC: 29 MMOL/L (ref 20–32)
CREAT SERPL-MCNC: 0.88 MG/DL (ref 0.7–1.18)
EOSINOPHIL # BLD AUTO: 138 CELLS/UL (ref 15–500)
EOSINOPHIL NFR BLD AUTO: 2 %
ERYTHROCYTE [DISTWIDTH] IN BLOOD BY AUTOMATED COUNT: 12 % (ref 11–15)
GLOBULIN SER CALC-MCNC: 2.6 G/DL (CALC) (ref 1.9–3.7)
GLUCOSE SERPL-MCNC: 98 MG/DL (ref 65–99)
HCT VFR BLD AUTO: 44.5 % (ref 38.5–50)
HDLC SERPL-MCNC: 50 MG/DL
HGB BLD-MCNC: 15.7 G/DL (ref 13.2–17.1)
LDLC SERPL CALC-MCNC: 100 MG/DL (CALC)
LYMPHOCYTES # BLD AUTO: 2215 CELLS/UL (ref 850–3900)
LYMPHOCYTES NFR BLD AUTO: 32.1 %
MCH RBC QN AUTO: 30.2 PG (ref 27–33)
MCHC RBC AUTO-ENTMCNC: 35.3 G/DL (ref 32–36)
MCV RBC AUTO: 85.6 FL (ref 80–100)
MONOCYTES # BLD AUTO: 421 CELLS/UL (ref 200–950)
MONOCYTES NFR BLD AUTO: 6.1 %
NEUTROPHILS # BLD AUTO: 4085 CELLS/UL (ref 1500–7800)
NEUTROPHILS NFR BLD AUTO: 59.2 %
NONHDLC SERPL-MCNC: 116 MG/DL (CALC)
PLATELET # BLD AUTO: 181 THOUSAND/UL (ref 140–400)
PMV BLD REES-ECKER: 9.9 FL (ref 7.5–12.5)
POTASSIUM SERPL-SCNC: 3.9 MMOL/L (ref 3.5–5.3)
PROT SERPL-MCNC: 7 G/DL (ref 6.1–8.1)
PSA SERPL-MCNC: 2.87 NG/ML
RBC # BLD AUTO: 5.2 MILLION/UL (ref 4.2–5.8)
SL AMB EGFR AFRICAN AMERICAN: 99 ML/MIN/1.73M2
SL AMB EGFR NON AFRICAN AMERICAN: 86 ML/MIN/1.73M2
SODIUM SERPL-SCNC: 140 MMOL/L (ref 135–146)
TRIGL SERPL-MCNC: 71 MG/DL
WBC # BLD AUTO: 6.9 THOUSAND/UL (ref 3.8–10.8)

## 2022-01-03 ENCOUNTER — OFFICE VISIT (OUTPATIENT)
Dept: FAMILY MEDICINE CLINIC | Facility: CLINIC | Age: 72
End: 2022-01-03
Payer: COMMERCIAL

## 2022-01-03 VITALS
DIASTOLIC BLOOD PRESSURE: 74 MMHG | BODY MASS INDEX: 29.42 KG/M2 | HEART RATE: 78 BPM | HEIGHT: 73 IN | RESPIRATION RATE: 16 BRPM | SYSTOLIC BLOOD PRESSURE: 120 MMHG | OXYGEN SATURATION: 96 % | TEMPERATURE: 97.3 F | WEIGHT: 222 LBS

## 2022-01-03 DIAGNOSIS — E78.5 HYPERLIPIDEMIA, UNSPECIFIED HYPERLIPIDEMIA TYPE: ICD-10-CM

## 2022-01-03 DIAGNOSIS — I15.9 SECONDARY HYPERTENSION: ICD-10-CM

## 2022-01-03 DIAGNOSIS — E78.00 HYPERCHOLESTEROLEMIA: ICD-10-CM

## 2022-01-03 DIAGNOSIS — E11.9 DIABETES MELLITUS WITHOUT COMPLICATION (HCC): ICD-10-CM

## 2022-01-03 DIAGNOSIS — I10 ESSENTIAL HYPERTENSION: Primary | ICD-10-CM

## 2022-01-03 PROBLEM — E66.09 CLASS 1 OBESITY DUE TO EXCESS CALORIES WITH SERIOUS COMORBIDITY AND BODY MASS INDEX (BMI) OF 32.0 TO 32.9 IN ADULT: Status: ACTIVE | Noted: 2021-01-23

## 2022-01-03 PROBLEM — E66.811 CLASS 1 OBESITY DUE TO EXCESS CALORIES WITH SERIOUS COMORBIDITY AND BODY MASS INDEX (BMI) OF 32.0 TO 32.9 IN ADULT: Status: RESOLVED | Noted: 2021-01-23 | Resolved: 2022-01-03

## 2022-01-03 PROBLEM — E66.09 CLASS 1 OBESITY DUE TO EXCESS CALORIES WITH SERIOUS COMORBIDITY AND BODY MASS INDEX (BMI) OF 32.0 TO 32.9 IN ADULT: Status: RESOLVED | Noted: 2021-01-23 | Resolved: 2022-01-03

## 2022-01-03 PROBLEM — E66.811 CLASS 1 OBESITY DUE TO EXCESS CALORIES WITH SERIOUS COMORBIDITY AND BODY MASS INDEX (BMI) OF 32.0 TO 32.9 IN ADULT: Status: ACTIVE | Noted: 2021-01-23

## 2022-01-03 LAB — SL AMB POCT HEMOGLOBIN AIC: 5.8 (ref ?–6.5)

## 2022-01-03 PROCEDURE — 83036 HEMOGLOBIN GLYCOSYLATED A1C: CPT | Performed by: FAMILY MEDICINE

## 2022-01-03 PROCEDURE — 99214 OFFICE O/P EST MOD 30 MIN: CPT | Performed by: FAMILY MEDICINE

## 2022-01-03 RX ORDER — HYDROCHLOROTHIAZIDE 25 MG/1
25 TABLET ORAL DAILY
Qty: 30 TABLET | Refills: 5 | Status: SHIPPED | OUTPATIENT
Start: 2022-01-03 | End: 2022-07-11 | Stop reason: SDUPTHER

## 2022-01-03 RX ORDER — SIMVASTATIN 80 MG
80 TABLET ORAL
Qty: 30 TABLET | Refills: 5 | Status: SHIPPED | OUTPATIENT
Start: 2022-01-03 | End: 2022-07-11 | Stop reason: SDUPTHER

## 2022-01-03 RX ORDER — AMLODIPINE BESYLATE 10 MG/1
10 TABLET ORAL DAILY
Qty: 30 TABLET | Refills: 5 | Status: SHIPPED | OUTPATIENT
Start: 2022-01-03 | End: 2022-07-11 | Stop reason: SDUPTHER

## 2022-01-03 RX ORDER — BENAZEPRIL HYDROCHLORIDE 40 MG/1
40 TABLET, FILM COATED ORAL DAILY
Qty: 30 TABLET | Refills: 5 | Status: SHIPPED | OUTPATIENT
Start: 2022-01-03 | End: 2022-07-11 | Stop reason: SDUPTHER

## 2022-01-03 NOTE — ASSESSMENT & PLAN NOTE
and LFTs normal in Dec 2021  Continue simvastatin 80 mg qhs  Advised pt to follow a low cholesterol diet and to exercise on a regular basis

## 2022-01-03 NOTE — PROGRESS NOTES
BMI Counseling: Body mass index is 29 29 kg/m²  The BMI is above normal  Nutrition recommendations include decreasing portion sizes, encouraging healthy choices of fruits and vegetables, consuming healthier snacks and moderation in carbohydrate intake  Exercise recommendations include exercising 3-5 times per week  No pharmacotherapy was ordered  Rationale for BMI follow-up plan is due to patient being overweight or obese  Depression Screening and Follow-up Plan:   Patient was screened for depression during today's encounter  They screened negative with a PHQ-2 score of 0  Assessment/Plan:         Problem List Items Addressed This Visit        Endocrine    Diabetes mellitus without complication ([facility] [address] )     I8P done today and was much better at 5 8  Continue metformin 1000 mg bid and can stop glipizide ER 5 mg qd  Advised pt to continue a low carb diet and to exercise on a regular basis  Pt had eye exam in Dec 2021 by Dr De Santiago Or  Foot exam done in Aug 2021  Pt declined flu shot  Lab Results   Component Value Date    HGBA1C 8 6 (A) 08/30/2021            Relevant Medications    metFORMIN (GLUCOPHAGE) 500 mg tablet    Other Relevant Orders    POCT hemoglobin A1c       Cardiovascular and Mediastinum    Essential hypertension - Primary     BP great  Continue amlodipine 10 mg qd, benazepril 40 mg qd, and HCTZ 25 mg qd  Pt advised to continue low Na diet and to exercise on a regular basis  Relevant Medications    amLODIPine (NORVASC) 10 mg tablet    benazepril (LOTENSIN) 40 MG tablet    hydrochlorothiazide (HYDRODIURIL) 25 mg tablet       Other    Hypercholesterolemia      and LFTs normal in Dec 2021  Continue simvastatin 80 mg qhs  Advised pt to follow a low cholesterol diet and to exercise on a regular basis            Relevant Medications    simvastatin (ZOCOR) 80 mg tablet      Other Visit Diagnoses     Secondary hypertension        Relevant Medications    amLODIPine (NORVASC) 10 mg tablet benazepril (LOTENSIN) 40 MG tablet    hydrochlorothiazide (HYDRODIURIL) 25 mg tablet    Hyperlipidemia, unspecified hyperlipidemia type        Relevant Medications    simvastatin (ZOCOR) 80 mg tablet            Subjective:      Patient ID: Benny Turcios is a 66 y o  male  Pt here for f/u HTN, HL, DM  Pt doing great  Has lost 21 lbs and following low carb diet and exercising at times  BP has been great and sugars in low 100s  Had labs done recently  Had eye exam recently by Dr Estefania Tenorio  No new c/o  The following portions of the patient's history were reviewed and updated as appropriate:   Past Medical History:  He has a past medical history of Diabetes mellitus ([address] ), Hypertension, Low back pain, and Sleep disorder  ,  _______________________________________________________________________  Medical Problems:  does not have any pertinent problems on file ,  _______________________________________________________________________  Past Surgical History:   has a past surgical history that includes Tonsillectomy  ,  _______________________________________________________________________  Family History:  family history includes No Known Problems in his father and mother ,  _______________________________________________________________________  Social History:   reports that he has never smoked  He has never used smokeless tobacco  He reports previous alcohol use  He reports that he does not use drugs  ,  _______________________________________________________________________  Allergies:  has No Known Allergies     _______________________________________________________________________  Current Outpatient Medications   Medication Sig Dispense Refill    amLODIPine (NORVASC) 10 mg tablet Take 1 tablet (10 mg total) by mouth daily 30 tablet 5    benazepril (LOTENSIN) 40 MG tablet Take 1 tablet (40 mg total) by mouth daily 30 tablet 5    hydrochlorothiazide (HYDRODIURIL) 25 mg tablet Take 1 tablet (25 mg total) by mouth daily 30 tablet 5    metFORMIN (GLUCOPHAGE) 500 mg tablet Take 2 tablets (1,000 mg total) by mouth 2 (two) times a day with meals 120 tablet 5    simvastatin (ZOCOR) 80 mg tablet Take 1 tablet (80 mg total) by mouth daily at bedtime 30 tablet 5     No current facility-administered medications for this visit      _______________________________________________________________________  Review of Systems   Constitutional: Negative for fatigue and unexpected weight change  Respiratory: Negative for cough and shortness of breath  Cardiovascular: Negative for chest pain  Gastrointestinal: Negative for abdominal pain, constipation, diarrhea and vomiting  Musculoskeletal: Negative for arthralgias  Neurological: Negative for dizziness and headaches  Psychiatric/Behavioral: Negative for dysphoric mood  The patient is not nervous/anxious  Objective:  Vitals:    01/03/22 1022   BP: 120/74   BP Location: Left arm   Patient Position: Sitting   Cuff Size: Standard   Pulse: 78   Resp: 16   Temp: (!) 97 3 °F (36 3 °C)   TempSrc: Temporal   SpO2: 96%   Weight: 101 kg (222 lb)   Height: 6' 1" (1 854 m)     Body mass index is 29 29 kg/m²  Physical Exam  Vitals and nursing note reviewed  Constitutional:       Appearance: Normal appearance  He is well-developed and normal weight  Neck:      Thyroid: No thyromegaly  Cardiovascular:      Rate and Rhythm: Normal rate and regular rhythm  Heart sounds: Normal heart sounds  No murmur heard  Pulmonary:      Effort: Pulmonary effort is normal  No respiratory distress  Breath sounds: Normal breath sounds  No wheezing  Musculoskeletal:      Cervical back: Normal range of motion and neck supple  Right lower leg: No edema  Left lower leg: No edema  Lymphadenopathy:      Cervical: No cervical adenopathy  Neurological:      Mental Status: He is alert and oriented to person, place, and time        Cranial Nerves: No cranial nerve deficit  Psychiatric:         Mood and Affect: Mood normal          Behavior: Behavior normal          Thought Content:  Thought content normal          Judgment: Judgment normal

## 2022-01-03 NOTE — ASSESSMENT & PLAN NOTE
A1C done today and was much better at 5 8  Continue metformin 1000 mg bid and can stop glipizide ER 5 mg qd  Advised pt to continue a low carb diet and to exercise on a regular basis  Pt had eye exam in Dec 2021 by Dr Tom Martin  Foot exam done in Aug 2021  Pt declined flu shot       Lab Results   Component Value Date    HGBA1C 8 6 (A) 08/30/2021

## 2022-01-03 NOTE — ASSESSMENT & PLAN NOTE
BP great  Continue amlodipine 10 mg qd, benazepril 40 mg qd, and HCTZ 25 mg qd  Pt advised to continue low Na diet and to exercise on a regular basis

## 2022-07-11 ENCOUNTER — OFFICE VISIT (OUTPATIENT)
Dept: FAMILY MEDICINE CLINIC | Facility: CLINIC | Age: 72
End: 2022-07-11
Payer: COMMERCIAL

## 2022-07-11 VITALS
BODY MASS INDEX: 30.09 KG/M2 | TEMPERATURE: 96.9 F | OXYGEN SATURATION: 96 % | WEIGHT: 227 LBS | RESPIRATION RATE: 16 BRPM | DIASTOLIC BLOOD PRESSURE: 70 MMHG | HEIGHT: 73 IN | SYSTOLIC BLOOD PRESSURE: 130 MMHG | HEART RATE: 91 BPM

## 2022-07-11 DIAGNOSIS — R01.1 SYSTOLIC MURMUR: ICD-10-CM

## 2022-07-11 DIAGNOSIS — Z12.11 SCREENING FOR COLON CANCER: ICD-10-CM

## 2022-07-11 DIAGNOSIS — I15.9 SECONDARY HYPERTENSION: ICD-10-CM

## 2022-07-11 DIAGNOSIS — E78.5 HYPERLIPIDEMIA, UNSPECIFIED HYPERLIPIDEMIA TYPE: ICD-10-CM

## 2022-07-11 DIAGNOSIS — Z00.00 MEDICARE ANNUAL WELLNESS VISIT, SUBSEQUENT: Primary | ICD-10-CM

## 2022-07-11 DIAGNOSIS — E11.9 DIABETES MELLITUS WITHOUT COMPLICATION (HCC): ICD-10-CM

## 2022-07-11 DIAGNOSIS — E78.00 HYPERCHOLESTEROLEMIA: ICD-10-CM

## 2022-07-11 DIAGNOSIS — I10 ESSENTIAL HYPERTENSION: ICD-10-CM

## 2022-07-11 LAB — SL AMB POCT HEMOGLOBIN AIC: 6.5 (ref ?–6.5)

## 2022-07-11 PROCEDURE — 83036 HEMOGLOBIN GLYCOSYLATED A1C: CPT | Performed by: FAMILY MEDICINE

## 2022-07-11 PROCEDURE — 99214 OFFICE O/P EST MOD 30 MIN: CPT | Performed by: FAMILY MEDICINE

## 2022-07-11 PROCEDURE — G0439 PPPS, SUBSEQ VISIT: HCPCS | Performed by: FAMILY MEDICINE

## 2022-07-11 RX ORDER — SIMVASTATIN 80 MG
80 TABLET ORAL
Qty: 30 TABLET | Refills: 5 | Status: SHIPPED | OUTPATIENT
Start: 2022-07-11

## 2022-07-11 RX ORDER — AMLODIPINE BESYLATE 10 MG/1
10 TABLET ORAL DAILY
Qty: 30 TABLET | Refills: 5 | Status: SHIPPED | OUTPATIENT
Start: 2022-07-11

## 2022-07-11 RX ORDER — BENAZEPRIL HYDROCHLORIDE 40 MG/1
40 TABLET, FILM COATED ORAL DAILY
Qty: 30 TABLET | Refills: 5 | Status: SHIPPED | OUTPATIENT
Start: 2022-07-11

## 2022-07-11 RX ORDER — HYDROCHLOROTHIAZIDE 25 MG/1
25 TABLET ORAL DAILY
Qty: 30 TABLET | Refills: 5 | Status: SHIPPED | OUTPATIENT
Start: 2022-07-11

## 2022-07-11 NOTE — PROGRESS NOTES
Assessment and Plan:     Problem List Items Addressed This Visit        Endocrine    Diabetes mellitus without complication ([facility] )     I7S done today and was 6 5  Continue metformin 1000 mg bid  Advised pt to continue a low carb diet and to exercise on a regular basis  Pt had eye exam in Sept 2021 by Dr Abdiaziz Keith  Foot exam done today  Lab Results   Component Value Date    HGBA1C 5 8 01/03/2022              Relevant Medications    metFORMIN (GLUCOPHAGE) 500 mg tablet    Other Relevant Orders    POCT hemoglobin A1c    Comprehensive metabolic panel       Cardiovascular and Mediastinum    Essential hypertension     BP has been good  Continue amlodipine 10 mg qd, benazepril 40 mg qd, and HCTZ 25 mg qd  Pt advised to continue low Na diet and to exercise on a regular basis  Relevant Medications    amLODIPine (NORVASC) 10 mg tablet    benazepril (LOTENSIN) 40 MG tablet    hydrochlorothiazide (HYDRODIURIL) 25 mg tablet       Other    Systolic murmur     Will check echo  Relevant Orders    Echo complete w/ contrast if indicated    Medicare annual wellness visit, subsequent - Primary     Wellness exam done  Last Tdap was in 2012  Pt had prevnar 13 and pneumovacc 23  Recommend COVID vaccines, flu shot, and Shingrix  Labs are UTD  Pt advised to schedule colonoscopy  No recent falls  Mood good  Hypercholesterolemia     Recheck lipids and LFTs normal  Continue simvastatin 80 mg qhs  Advised pt to follow a low cholesterol diet and to exercise on a regular basis              Relevant Medications    simvastatin (ZOCOR) 80 mg tablet    Other Relevant Orders    Comprehensive metabolic panel    Lipid panel      Other Visit Diagnoses     Secondary hypertension        Relevant Medications    amLODIPine (NORVASC) 10 mg tablet    benazepril (LOTENSIN) 40 MG tablet    hydrochlorothiazide (HYDRODIURIL) 25 mg tablet    Hyperlipidemia, unspecified hyperlipidemia type        Relevant Medications    simvastatin (ZOCOR) 80 mg tablet    Screening for colon cancer        Relevant Orders    Cologuard          Depression Screening and Follow-up Plan: Patient was screened for depression during today's encounter  They screened negative with a PHQ-2 score of 0  Preventive health issues were discussed with patient, and age appropriate screening tests were ordered as noted in patient's After Visit Summary  Personalized health advice and appropriate referrals for health education or preventive services given if needed, as noted in patient's After Visit Summary  History of Present Illness:     Patient presents for a Medicare Wellness Visit    Pt here for f/u HTN, HL, DM  Pt also due for wellness exam  Doing well  No cp/sob  No headaches  No abd pain  Has been eating more carbs  Pt exercises at times  BP doing ok  No new c/o  Patient Care Team:  Stephen Walter MD as PCP - General (Family Medicine)  Stephen Walter MD as PCP - [address] [place] [address] (RTE)     Review of Systems:     Review of Systems   Constitutional: Negative for fatigue and unexpected weight change  Respiratory: Negative for cough and shortness of breath  Cardiovascular: Negative for chest pain  Gastrointestinal: Negative for abdominal pain, constipation, diarrhea and vomiting  Musculoskeletal: Negative for arthralgias  Neurological: Negative for dizziness and headaches  Psychiatric/Behavioral: Negative for dysphoric mood  The patient is not nervous/anxious           Problem List:     Patient Active Problem List   Diagnosis    Essential hypertension    [facility]    Hypercholesterolemia    MARYAM (generalized anxiety disorder)    RICHARD (obstructive sleep apnea)    Medicare annual wellness visit, subsequent    Systolic murmur      Past Medical and Surgical History:     Past Medical History:   Diagnosis Date    Diabetes mellitus ([facility] [address] )     Hypertension     Low back pain     Sleep disorder      Past Surgical History:   Procedure Laterality Date    TONSILLECTOMY        Family History:     Family History   Problem Relation Age of Onset    No Known Problems Mother     No Known Problems Father       Social History:     Social History     Socioeconomic History    Marital status: /Civil Union     Spouse name: None    Number of children: None    Years of education: None    Highest education level: None   Occupational History    None   Tobacco Use    Smoking status: Never Smoker    Smokeless tobacco: Never Used   Vaping Use    Vaping Use: Never used   Substance and Sexual Activity    Alcohol use: Not Currently    Drug use: Never    Sexual activity: Not Currently   Other Topics Concern    None   Social History Narrative    Most recent tobacco use screenin2019     Social Determinants of Health     Financial Resource Strain: Not on file   Food Insecurity: Not on file   Transportation Needs: Not on file   Physical Activity: Not on file   Stress: Not on file   Social Connections: Not on file   Intimate Partner Violence: Not on file   Housing Stability: Not on file      Medications and Allergies:     Current Outpatient Medications   Medication Sig Dispense Refill    amLODIPine (NORVASC) 10 mg tablet Take 1 tablet (10 mg total) by mouth daily 30 tablet 5    benazepril (LOTENSIN) 40 MG tablet Take 1 tablet (40 mg total) by mouth daily 30 tablet 5    hydrochlorothiazide (HYDRODIURIL) 25 mg tablet Take 1 tablet (25 mg total) by mouth daily 30 tablet 5    metFORMIN (GLUCOPHAGE) 500 mg tablet Take 2 tablets (1,000 mg total) by mouth 2 (two) times a day with meals 120 tablet 5    simvastatin (ZOCOR) 80 mg tablet Take 1 tablet (80 mg total) by mouth daily at bedtime 30 tablet 5     No current facility-administered medications for this visit       No Known Allergies   Immunizations:     Immunization History   Administered Date(s) Administered    Pneumococcal Conjugate 13-Valent 10/19/2015    Pneumococcal Polysaccharide PPV23 10/14/2014    Tdap 05/21/2012      Health Maintenance:         Topic Date Due    Hepatitis C Screening  Never done    Colorectal Cancer Screening  Never done         Topic Date Due    COVID-19 Vaccine (1) Never done    Influenza Vaccine (1) 09/01/2022      Medicare Screening Tests and Risk Assessments:     Gilda Bush is here for his Subsequent Wellness visit  Health Risk Assessment:   Patient rates overall health as very good  Patient feels that their physical health rating is slightly better  Patient is very dissatisfied with their life  Eyesight was rated as same  Hearing was rated as same  Patient feels that their emotional and mental health rating is slightly worse  Patients states they are often angry  Patient states they are never, rarely unusually tired/fatigued  Pain experienced in the last 7 days has been some  Patient's pain rating has been 2/10  Patient states that he has experienced no weight loss or gain in last 6 months  Depression Screening:   PHQ-2 Score: 0      Fall Risk Screening: In the past year, patient has experienced: history of falling in past year    Number of falls: 1  Injured during fall?: No    Feels unsteady when standing or walking?: No    Worried about falling?: No      Home Safety:  Patient has trouble with stairs inside or outside of their home  Patient has working smoke alarms and has no working carbon monoxide detector  Home safety hazards include: none  Nutrition:   Current diet is Regular and Low Carb  Medications:   Patient is currently taking over-the-counter supplements  OTC medications include: see medication list  Patient is able to manage medications  Activities of Daily Living (ADLs)/Instrumental Activities of Daily Living (IADLs):   Walk and transfer into and out of bed and chair?: Yes  Dress and groom yourself?: Yes    Bathe or shower yourself?: Yes    Feed yourself?  Yes  Do your laundry/housekeeping?: Yes  Manage your money, pay your bills and track your expenses?: Yes  Make your own meals?: Yes    Do your own shopping?: Yes    Previous Hospitalizations:   Any hospitalizations or ED visits within the last 12 months?: No      Advance Care Planning:   Living will: No    Durable POA for healthcare: No    Advanced directive: No    Advanced directive counseling given: Yes    Five wishes given: Yes      Cognitive Screening:   Provider or family/friend/caregiver concerned regarding cognition?: No    PREVENTIVE SCREENINGS      Cardiovascular Screening:    General: Screening Not Indicated and History Lipid Disorder      Diabetes Screening:     General: Screening Not Indicated and History Diabetes      Colorectal Cancer Screening:       Due for: Cologuard      Prostate Cancer Screening:    General: Screening Current      Osteoporosis Screening:    General: Screening Not Indicated      Abdominal Aortic Aneurysm (AAA) Screening:    Risk factors include: age between 73-67 yo        General: Screening Not Indicated      Lung Cancer Screening:     General: Screening Not Indicated    Screening, Brief Intervention, and Referral to Treatment (SBIRT)    Screening  Typical number of drinks in a day: 0  Typical number of drinks in a week: 0  Interpretation: Low risk drinking behavior  Single Item Drug Screening:  How often have you used an illegal drug (including marijuana) or a prescription medication for non-medical reasons in the past year? never    Single Item Drug Screen Score: 0  Interpretation: Negative screen for possible drug use disorder    Brief Intervention  Alcohol & drug use screenings were reviewed  No concerns regarding substance use disorder identified       No exam data present     Physical Exam:     /70 (BP Location: Left arm, Patient Position: Sitting, Cuff Size: Large)   Pulse 91   Temp (!) 96 9 °F (36 1 °C) (Temporal)   Resp 16   Ht 6' 1" (1 854 m)   Wt 103 kg (227 lb)   SpO2 96%   BMI 29 95 kg/m²     Physical Exam  Vitals and nursing note reviewed  Constitutional:       Appearance: Normal appearance  He is obese  Neck:      Vascular: No carotid bruit  Cardiovascular:      Rate and Rhythm: Normal rate and regular rhythm  Pulses: no weak pulses     Heart sounds: Murmur heard  Pulmonary:      Effort: Pulmonary effort is normal       Breath sounds: Normal breath sounds  No wheezing  Musculoskeletal:      Cervical back: Normal range of motion and neck supple  No muscular tenderness  Right lower leg: No edema  Left lower leg: No edema  Feet:      Right foot:      Skin integrity: No ulcer, skin breakdown, erythema, warmth, callus or dry skin  Left foot:      Skin integrity: No ulcer, skin breakdown, erythema, warmth, callus or dry skin  Lymphadenopathy:      Cervical: No cervical adenopathy  Neurological:      Mental Status: He is alert  Psychiatric:         Mood and Affect: Mood normal          Behavior: Behavior normal          Thought Content: Thought content normal          Judgment: Judgment normal      Patient's shoes and socks removed  Right Foot/Ankle   Right Foot Inspection  Skin Exam: skin normal and skin intact  No dry skin, no warmth, no callus, no erythema, no maceration, no abnormal color, no pre-ulcer, no ulcer and no callus  Sensory   Proprioception: intact      Vascular  Capillary refills: < 3 seconds          Left Foot/Ankle  Left Foot Inspection  Skin Exam: skin normal and skin intact  No dry skin, no warmth, no erythema, no maceration, normal color, no pre-ulcer, no ulcer and no callus       Sensory   Proprioception: intact      Vascular  Capillary refills: < 3 seconds        Assign Risk Category  No deformity present  No loss of protective sensation  No weak pulses  Risk: 0        Gerri Guillen MD

## 2022-07-11 NOTE — PATIENT INSTRUCTIONS
Medicare Preventive Visit Patient Instructions  Thank you for completing your Welcome to Medicare Visit or Medicare Annual Wellness Visit today  Your next wellness visit will be due in one year (7/12/2023)  The screening/preventive services that you may require over the next 5-10 years are detailed below  Some tests may not apply to you based off risk factors and/or age  Screening tests ordered at today's visit but not completed yet may show as past due  Also, please note that scanned in results may not display below  Preventive Screenings:  Service Recommendations Previous Testing/Comments   Colorectal Cancer Screening  · Colonoscopy    · Fecal Occult Blood Test (FOBT)/Fecal Immunochemical Test (FIT)  · Fecal DNA/Cologuard Test  · Flexible Sigmoidoscopy Age: 54-65 years old   Colonoscopy: every 10 years (May be performed more frequently if at higher risk)  OR  FOBT/FIT: every 1 year  OR  Cologuard: every 3 years  OR  Sigmoidoscopy: every 5 years  Screening may be recommended earlier than age 48 if at higher risk for colorectal cancer  Also, an individualized decision between you and your healthcare provider will decide whether screening between the ages of 74-80 would be appropriate   Colonoscopy: Not on file  FOBT/FIT: Not on file  Cologuard: Not on file  Sigmoidoscopy: Not on file          Prostate Cancer Screening Individualized decision between patient and health care provider in men between ages of 53-78   Medicare will cover every 12 months beginning on the day after your 50th birthday PSA: 2 87 ng/mL     Screening Current     Hepatitis C Screening Once for adults born between 1945 and 1965  More frequently in patients at high risk for Hepatitis C Hep C Antibody: Not on file        Diabetes Screening 1-2 times per year if you're at risk for diabetes or have pre-diabetes Fasting glucose: No results in last 5 years   A1C: 5 8    Screening Not Indicated  History Diabetes   Cholesterol Screening Once every 5 years if you don't have a lipid disorder  May order more often based on risk factors  Lipid panel: 12/16/2021    Screening Not Indicated  History Lipid Disorder      Other Preventive Screenings Covered by Medicare:  1  Abdominal Aortic Aneurysm (AAA) Screening: covered once if your at risk  You're considered to be at risk if you have a family history of AAA or a male between the age of 73-68 who smoking at least 100 cigarettes in your lifetime  2  Lung Cancer Screening: covers low dose CT scan once per year if you meet all of the following conditions: (1) Age 50-69; (2) No signs or symptoms of lung cancer; (3) Current smoker or have quit smoking within the last 15 years; (4) You have a tobacco smoking history of at least 30 pack years (packs per day x number of years you smoked); (5) You get a written order from a healthcare provider  3  Glaucoma Screening: covered annually if you're considered high risk: (1) You have diabetes OR (2) Family history of glaucoma OR (3)  aged 48 and older OR (3)  American aged 72 and older  3  Osteoporosis Screening: covered every 2 years if you meet one of the following conditions: (1) Have a vertebral abnormality; (2) On glucocorticoid therapy for more than 3 months; (3) Have primary hyperparathyroidism; (4) On osteoporosis medications and need to assess response to drug therapy  5  HIV Screening: covered annually if you're between the age of 12-76  Also covered annually if you are younger than 13 and older than 72 with risk factors for HIV infection  For pregnant patients, it is covered up to 3 times per pregnancy      Immunizations:  Immunization Recommendations   Influenza Vaccine Annual influenza vaccination during flu season is recommended for all persons aged >= 6 months who do not have contraindications   Pneumococcal Vaccine (Prevnar and Pneumovax)  * Prevnar = PCV13  * Pneumovax = PPSV23 Adults 25-60 years old: 1-3 doses may be recommended based on certain risk factors  Adults 72 years old: Prevnar (PCV13) vaccine recommended followed by Pneumovax (PPSV23) vaccine  If already received PPSV23 since turning 65, then PCV13 recommended at least one year after PPSV23 dose  Hepatitis B Vaccine 3 dose series if at intermediate or high risk (ex: diabetes, end stage renal disease, liver disease)   Tetanus (Td) Vaccine - COST NOT COVERED BY MEDICARE PART B Following completion of primary series, a booster dose should be given every 10 years to maintain immunity against tetanus  Td may also be given as tetanus wound prophylaxis  Tdap Vaccine - COST NOT COVERED BY MEDICARE PART B Recommended at least once for all adults  For pregnant patients, recommended with each pregnancy  Shingles Vaccine (Shingrix) - COST NOT COVERED BY MEDICARE PART B  2 shot series recommended in those aged 48 and above     Health Maintenance Due:      Topic Date Due    Hepatitis C Screening  Never done    Colorectal Cancer Screening  Never done     Immunizations Due:      Topic Date Due    COVID-19 Vaccine (1) Never done    Influenza Vaccine (1) 09/01/2022     Advance Directives   What are advance directives? Advance directives are legal documents that state your wishes and plans for medical care  These plans are made ahead of time in case you lose your ability to make decisions for yourself  Advance directives can apply to any medical decision, such as the treatments you want, and if you want to donate organs  What are the types of advance directives? There are many types of advance directives, and each state has rules about how to use them  You may choose a combination of any of the following:  · Living will: This is a written record of the treatment you want  You can also choose which treatments you do not want, which to limit, and which to stop at a certain time  This includes surgery, medicine, IV fluid, and tube feedings     · Durable power of  for healthcare [facility]): This is a written record that states who you want to make healthcare choices for you when you are unable to make them for yourself  This person, called a proxy, is usually a family member or a friend  You may choose more than 1 proxy  · Do not resuscitate (DNR) order:  A DNR order is used in case your heart stops beating or you stop breathing  It is a request not to have certain forms of treatment, such as CPR  A DNR order may be included in other types of advance directives  · Medical directive: This covers the care that you want if you are in a coma, near death, or unable to make decisions for yourself  You can list the treatments you want for each condition  Treatment may include pain medicine, surgery, blood transfusions, dialysis, IV or tube feedings, and a ventilator (breathing machine)  · Values history: This document has questions about your views, beliefs, and how you feel and think about life  This information can help others choose the care that you would choose  Why are advance directives important? An advance directive helps you control your care  Although spoken wishes may be used, it is better to have your wishes written down  Spoken wishes can be misunderstood, or not followed  Treatments may be given even if you do not want them  An advance directive may make it easier for your family to make difficult choices about your care  Fall Prevention    Fall prevention  includes ways to make your home and other areas safer  It also includes ways you can move more carefully to prevent a fall  Health conditions that cause changes in your blood pressure, vision, or muscle strength and coordination may increase your risk for falls  Medicines may also increase your risk for falls if they make you dizzy, weak, or sleepy  Fall prevention tips:   · Stand or sit up slowly  · Use assistive devices as directed  · Wear shoes that fit well and have soles that   · Wear a personal alarm      · Stay active  · Manage your medical conditions  Home Safety Tips:  · Add items to prevent falls in the bathroom  · Keep paths clear  · Install bright lights in your home  · Keep items you use often on shelves within reach  · Paint or place reflective tape on the edges of your stairs  Weight Management   Why it is important to manage your weight:  Being overweight increases your risk of health conditions such as heart disease, high blood pressure, type 2 diabetes, and certain types of cancer  It can also increase your risk for osteoarthritis, sleep apnea, and other respiratory problems  Aim for a slow, steady weight loss  Even a small amount of weight loss can lower your risk of health problems  How to lose weight safely:  A safe and healthy way to lose weight is to eat fewer calories and get regular exercise  You can lose up about 1 pound a week by decreasing the number of calories you eat by 500 calories each day  Healthy meal plan for weight management:  A healthy meal plan includes a variety of foods, contains fewer calories, and helps you stay healthy  A healthy meal plan includes the following:  · Eat whole-grain foods more often  A healthy meal plan should contain fiber  Fiber is the part of grains, fruits, and vegetables that is not broken down by your body  Whole-grain foods are healthy and provide extra fiber in your diet  Some examples of whole-grain foods are whole-wheat breads and pastas, oatmeal, brown rice, and bulgur  · Eat a variety of vegetables every day  Include dark, leafy greens such as spinach, kale, maria alejandra greens, and mustard greens  Eat yellow and orange vegetables such as carrots, sweet potatoes, and winter squash  · Eat a variety of fruits every day  Choose fresh or canned fruit (canned in its own juice or light syrup) instead of juice  Fruit juice has very little or no fiber  · Eat low-fat dairy foods  Drink fat-free (skim) milk or 1% milk   Eat fat-free yogurt and low-fat cottage cheese  Try low-fat cheeses such as mozzarella and other reduced-fat cheeses  · Choose meat and other protein foods that are low in fat  Choose beans or other legumes such as split peas or lentils  Choose fish, skinless poultry (chicken or turkey), or lean cuts of red meat (beef or pork)  Before you cook meat or poultry, cut off any visible fat  · Use less fat and oil  Try baking foods instead of frying them  Add less fat, such as margarine, sour cream, regular salad dressing and mayonnaise to foods  Eat fewer high-fat foods  Some examples of high-fat foods include french fries, doughnuts, ice cream, and cakes  · Eat fewer sweets  Limit foods and drinks that are high in sugar  This includes candy, cookies, regular soda, and sweetened drinks  Exercise:  Exercise at least 30 minutes per day on most days of the week  Some examples of exercise include walking, biking, dancing, and swimming  You can also fit in more physical activity by taking the stairs instead of the elevator or parking farther away from stores  Ask your healthcare provider about the best exercise plan for you  © Copyright [employer] 2018 Information is for End User's use only and may not be sold, redistributed or otherwise used for commercial purposes   All illustrations and images included in CareNotes® are the copyrighted property of [employer]  or [address] [place] [address] [employer]

## 2022-07-11 NOTE — ASSESSMENT & PLAN NOTE
BP has been good  Continue amlodipine 10 mg qd, benazepril 40 mg qd, and HCTZ 25 mg qd  Pt advised to continue low Na diet and to exercise on a regular basis

## 2022-07-11 NOTE — ASSESSMENT & PLAN NOTE
Wellness exam done  Last Tdap was in 2012  Pt had prevnar 13 and pneumovacc 23  Recommend COVID vaccines, flu shot, and Shingrix  Labs are UTD  Pt advised to schedule colonoscopy  No recent falls  Mood good

## 2022-07-11 NOTE — ASSESSMENT & PLAN NOTE
Recheck lipids and LFTs normal  Continue simvastatin 80 mg qhs  Advised pt to follow a low cholesterol diet and to exercise on a regular basis

## 2022-07-11 NOTE — ASSESSMENT & PLAN NOTE
A1C done today and was 6 5  Continue metformin 1000 mg bid  Advised pt to continue a low carb diet and to exercise on a regular basis  Pt had eye exam in Sept 2021 by Dr Anusha Saunders  Foot exam done today        Lab Results   Component Value Date    HGBA1C 5 8 01/03/2022

## 2022-10-12 PROBLEM — Z00.00 MEDICARE ANNUAL WELLNESS VISIT, SUBSEQUENT: Status: RESOLVED | Noted: 2021-01-29 | Resolved: 2022-10-12

## 2022-11-14 LAB
ALBUMIN SERPL-MCNC: 4.5 G/DL (ref 3.6–5.1)
ALBUMIN/GLOB SERPL: 1.9 (CALC) (ref 1–2.5)
ALP SERPL-CCNC: 49 U/L (ref 35–144)
ALT SERPL-CCNC: 15 U/L (ref 9–46)
AST SERPL-CCNC: 18 U/L (ref 10–35)
BILIRUB SERPL-MCNC: 0.6 MG/DL (ref 0.2–1.2)
BUN SERPL-MCNC: 17 MG/DL (ref 7–25)
BUN/CREAT SERPL: ABNORMAL (CALC) (ref 6–22)
CALCIUM SERPL-MCNC: 9.5 MG/DL (ref 8.6–10.3)
CHLORIDE SERPL-SCNC: 104 MMOL/L (ref 98–110)
CHOLEST SERPL-MCNC: 142 MG/DL
CHOLEST/HDLC SERPL: 2.8 (CALC)
CO2 SERPL-SCNC: 29 MMOL/L (ref 20–32)
CREAT SERPL-MCNC: 0.9 MG/DL (ref 0.7–1.28)
GFR/BSA.PRED SERPLBLD CYS-BASED-ARV: 90 ML/MIN/1.73M2
GLOBULIN SER CALC-MCNC: 2.4 G/DL (CALC) (ref 1.9–3.7)
GLUCOSE SERPL-MCNC: 139 MG/DL (ref 65–99)
HDLC SERPL-MCNC: 51 MG/DL
LDLC SERPL CALC-MCNC: 74 MG/DL (CALC)
NONHDLC SERPL-MCNC: 91 MG/DL (CALC)
POTASSIUM SERPL-SCNC: 3.5 MMOL/L (ref 3.5–5.3)
PROT SERPL-MCNC: 6.9 G/DL (ref 6.1–8.1)
SODIUM SERPL-SCNC: 140 MMOL/L (ref 135–146)
TRIGL SERPL-MCNC: 85 MG/DL

## 2022-11-22 ENCOUNTER — HOSPITAL ENCOUNTER (OUTPATIENT)
Dept: NON INVASIVE DIAGNOSTICS | Facility: HOSPITAL | Age: 72
Discharge: HOME | End: 2022-11-22
Attending: FAMILY MEDICINE

## 2022-11-22 VITALS
WEIGHT: 227 LBS | HEART RATE: 90 BPM | BODY MASS INDEX: 30.09 KG/M2 | SYSTOLIC BLOOD PRESSURE: 130 MMHG | HEIGHT: 73 IN | DIASTOLIC BLOOD PRESSURE: 70 MMHG

## 2022-11-22 DIAGNOSIS — R01.1 SYSTOLIC MURMUR: ICD-10-CM

## 2022-11-22 LAB
AORTIC ROOT: 3.2 CM
APICAL FOUR CHAMBER EJECTION FRACTION: 68 %
AVA (PLAN): 2.8 CM2
FRACTIONAL SHORTENING: 30 (ref 28–44)
GLOBAL LONGITUIDAL STRAIN: 15 %
INTERVENTRICULAR SEPTUM IN DIASTOLE (PARASTERNAL SHORT AXIS VIEW): 2.2 CM
INTERVENTRICULAR SEPTUM: 2.2 CM (ref 0.6–1.1)
LAAS-AP4: 27.7 CM2
LEFT ATRIUM SIZE: 5.1 CM
LEFT INTERNAL DIMENSION IN SYSTOLE: 3.3 CM (ref 2.1–4)
LEFT VENTRICULAR INTERNAL DIMENSION IN DIASTOLE: 4.7 CM (ref 3.5–6)
LEFT VENTRICULAR POSTERIOR WALL IN END DIASTOLE: 1.3 CM
LEFT VENTRICULAR STROKE VOLUME: 56 ML
LVSV (TEICH): 56 ML
RA PRESSURE ESTIMATED: 4 MMHG
RIGHT ATRIAL 2D VOLUME: 54 ML
RIGHT ATRIUM AREA SYSTOLE A4C: 19.8 CM2
RIGHT VENTRICLE ID DIMENSION: 3.2 CM
RV PSP: 24 MMHG
SL CV ECHO LV DYNAMIC OBSTRUCTION PEAK GRADIENT (REST): 60 MMHG
SL CV ECHO LV DYNAMIC OBSTRUCTION PEAK GRADIENT (VALSAL: 77 MMHG
SL CV LV EF: 65
SL CV PED ECHO LEFT VENTRICLE DIASTOLIC VOLUME (MOD BIPLANE) 2D: 100 ML
SL CV PED ECHO LEFT VENTRICLE SYSTOLIC VOLUME (MOD BIPLANE) 2D: 45 ML
TR MAX PG: 20 MMHG
TR PEAK VELOCITY: 2.2 M/S
TRICUSPID VALVE PEAK REGURGITATION VELOCITY: 2.23 M/S

## 2022-12-06 ENCOUNTER — TELEPHONE (OUTPATIENT)
Dept: FAMILY MEDICINE CLINIC | Facility: CLINIC | Age: 72
End: 2022-12-06

## 2022-12-06 DIAGNOSIS — R01.1 SYSTOLIC MURMUR: Primary | ICD-10-CM

## 2022-12-15 PROBLEM — R01.1 SYSTOLIC MURMUR: Status: RESOLVED | Noted: 2021-08-30 | Resolved: 2022-12-15

## 2022-12-15 PROBLEM — I42.2 HYPERTROPHIC CARDIOMYOPATHY (HCC): Status: ACTIVE | Noted: 2022-12-15

## 2023-01-11 ENCOUNTER — TELEPHONE (OUTPATIENT)
Dept: FAMILY MEDICINE CLINIC | Facility: CLINIC | Age: 73
End: 2023-01-11

## 2023-01-11 DIAGNOSIS — I10 ESSENTIAL HYPERTENSION: ICD-10-CM

## 2023-01-11 DIAGNOSIS — E78.5 HYPERLIPIDEMIA, UNSPECIFIED HYPERLIPIDEMIA TYPE: ICD-10-CM

## 2023-01-11 DIAGNOSIS — I15.9 SECONDARY HYPERTENSION: ICD-10-CM

## 2023-01-11 DIAGNOSIS — E11.9 DIABETES MELLITUS WITHOUT COMPLICATION (HCC): ICD-10-CM

## 2023-01-11 RX ORDER — HYDROCHLOROTHIAZIDE 25 MG/1
25 TABLET ORAL DAILY
Qty: 30 TABLET | Refills: 5 | Status: SHIPPED | OUTPATIENT
Start: 2023-01-11

## 2023-01-11 RX ORDER — BENAZEPRIL HYDROCHLORIDE 40 MG/1
40 TABLET, FILM COATED ORAL DAILY
Qty: 30 TABLET | Refills: 5 | Status: SHIPPED | OUTPATIENT
Start: 2023-01-11

## 2023-01-11 RX ORDER — AMLODIPINE BESYLATE 10 MG/1
10 TABLET ORAL DAILY
Qty: 30 TABLET | Refills: 5 | Status: SHIPPED | OUTPATIENT
Start: 2023-01-11

## 2023-01-11 RX ORDER — SIMVASTATIN 80 MG
80 TABLET ORAL
Qty: 30 TABLET | Refills: 5 | Status: SHIPPED | OUTPATIENT
Start: 2023-01-11

## 2023-01-11 NOTE — TELEPHONE ENCOUNTER
Refill:   Amlodipine 10mg 1 daily  Benazepril 40mg 1 daily  HCTZ 25mg 1 daily  Metformin 500mg 2 x day  Simvastatin 80mg 1 at bed  Aristides Bolaños

## 2023-01-18 ENCOUNTER — TELEPHONE (OUTPATIENT)
Dept: ADMINISTRATIVE | Facility: OTHER | Age: 73
End: 2023-01-18

## 2023-01-27 ENCOUNTER — RA CDI HCC (OUTPATIENT)
Dept: OTHER | Facility: HOSPITAL | Age: 73
End: 2023-01-27

## 2023-01-27 NOTE — PROGRESS NOTES
[facility]  coding opportunities       Chart reviewed, no opportunity found:   [address]        Patients Insurance     Medicare Insurance: [employer]vantage

## 2023-02-02 ENCOUNTER — OFFICE VISIT (OUTPATIENT)
Dept: FAMILY MEDICINE CLINIC | Facility: CLINIC | Age: 73
End: 2023-02-02

## 2023-02-02 VITALS
SYSTOLIC BLOOD PRESSURE: 120 MMHG | BODY MASS INDEX: 27.83 KG/M2 | DIASTOLIC BLOOD PRESSURE: 64 MMHG | RESPIRATION RATE: 16 BRPM | HEIGHT: 73 IN | WEIGHT: 210 LBS | HEART RATE: 73 BPM | OXYGEN SATURATION: 97 % | TEMPERATURE: 97.3 F

## 2023-02-02 DIAGNOSIS — I10 ESSENTIAL HYPERTENSION: Primary | ICD-10-CM

## 2023-02-02 DIAGNOSIS — E78.00 HYPERCHOLESTEROLEMIA: ICD-10-CM

## 2023-02-02 DIAGNOSIS — E11.9 DIABETES MELLITUS WITHOUT COMPLICATION (HCC): ICD-10-CM

## 2023-02-02 DIAGNOSIS — I42.2 HYPERTROPHIC CARDIOMYOPATHY (HCC): ICD-10-CM

## 2023-02-02 DIAGNOSIS — Z12.5 PROSTATE CANCER SCREENING: ICD-10-CM

## 2023-02-02 LAB — SL AMB POCT HEMOGLOBIN AIC: 5.7 (ref ?–6.5)

## 2023-02-02 NOTE — ASSESSMENT & PLAN NOTE
Pt had echocardiogram in Nov 2022  Pt currently has no cardiac symptoms but will refer to Cardiology for further evaluation and management

## 2023-02-02 NOTE — PROGRESS NOTES
BMI Counseling: There is no height or weight on file to calculate BMI  The BMI is above normal  Nutrition recommendations include decreasing portion sizes, encouraging healthy choices of fruits and vegetables, consuming healthier snacks and moderation in carbohydrate intake  Exercise recommendations include exercising 3-5 times per week  No pharmacotherapy was ordered  Rationale for BMI follow-up plan is due to patient being overweight or obese  Depression Screening and Follow-up Plan: Patient was screened for depression during today's encounter  They screened negative with a PHQ-2 score of 0  Assessment/Plan:         Problem List Items Addressed This Visit        Endocrine    Diabetes mellitus without complication (HCC)     W2E done today and was 5 7  Continue metformin and pt told that he can decrease to 500 mg in AM and 1000 mg in dinner  Advised pt to continue a low carb diet and to exercise on a regular basis  Pt had eye exam in Sept 2021 by Dr Vera Poplar  Foot exam done today  Lab Results   Component Value Date    HGBA1C 6 5 07/11/2022            Relevant Orders    POCT hemoglobin A1c    Microalbumin / creatinine urine ratio    Hemoglobin A1C    Comprehensive metabolic panel       Cardiovascular and Mediastinum    Hypertrophic cardiomyopathy ([address] )     Pt had echocardiogram in Nov 2022  Pt currently has no cardiac symptoms but will refer to Cardiology for further evaluation and management  Relevant Orders    Ambulatory Referral to Cardiology    Essential hypertension - Primary     BP great  Continue amlodipine 10 mg qd, benazepril 40 mg qd, and HCTZ 25 mg qd  Pt advised to continue low Na diet and to exercise on a regular basis  Relevant Orders    CBC and differential    Comprehensive metabolic panel       Other    Hypercholesterolemia     LDL 74 and LFTs normal in Nov 2022  Continue simvastatin 80 mg qhs  Advised pt to follow a low cholesterol diet and to exercise on a regular basis  Relevant Orders    Lipid panel    Comprehensive metabolic panel   Other Visit Diagnoses     Prostate cancer screening        Relevant Orders    PSA, Total Screen            Subjective:      Patient ID: Cher Dumont is a 67 y o  male  Pt here for f/u HTN, HL, DM  Doing well  No cp/sob  No headaches  BP good  Following low carb diet  Lost 17 lbs since July 2022 due to diet  Pt checking sugars at home and have been good  No regular exercise  Had echo in Nov 2022  Needs to get eye exam        The following portions of the patient's history were reviewed and updated as appropriate:   Past Medical History:  He has a past medical history of Diabetes mellitus ([address] ), Hypertension, Low back pain, and Sleep disorder  ,  _______________________________________________________________________  Medical Problems:  does not have any pertinent problems on file ,  _______________________________________________________________________  Past Surgical History:   has a past surgical history that includes Tonsillectomy  ,  _______________________________________________________________________  Family History:  family history includes No Known Problems in his father and mother ,  _______________________________________________________________________  Social History:   reports that he has never smoked  He has never used smokeless tobacco  He reports that he does not currently use alcohol  He reports that he does not use drugs  ,  _______________________________________________________________________  Allergies:  has No Known Allergies     _______________________________________________________________________  Current Outpatient Medications   Medication Sig Dispense Refill   • amLODIPine (NORVASC) 10 mg tablet Take 1 tablet (10 mg total) by mouth daily 30 tablet 5   • benazepril (LOTENSIN) 40 MG tablet Take 1 tablet (40 mg total) by mouth daily 30 tablet 5   • hydrochlorothiazide (HYDRODIURIL) 25 mg tablet Take 1 tablet (25 mg total) by mouth daily 30 tablet 5   • metFORMIN (GLUCOPHAGE) 500 mg tablet Take 2 tablets (1,000 mg total) by mouth 2 (two) times a day with meals 120 tablet 5   • simvastatin (ZOCOR) 80 mg tablet Take 1 tablet (80 mg total) by mouth daily at bedtime 30 tablet 5     No current facility-administered medications for this visit      _______________________________________________________________________  Review of Systems   Constitutional: Negative for fatigue and unexpected weight change  Eyes: Negative for visual disturbance  Respiratory: Negative for chest tightness and shortness of breath  Cardiovascular: Negative for chest pain and palpitations  Gastrointestinal: Negative for abdominal pain, constipation, diarrhea, nausea and vomiting  Endocrine: Negative for polydipsia, polyphagia and polyuria  Genitourinary: Negative for frequency  Musculoskeletal: Negative for arthralgias  Skin: Negative for rash and wound  Neurological: Negative for numbness  Objective:  Vitals:    02/02/23 1022   BP: 120/64   BP Location: Left arm   Patient Position: Sitting   Cuff Size: Standard   Pulse: 73   Resp: 16   Temp: (!) 97 3 °F (36 3 °C)   TempSrc: Tympanic   SpO2: 97%   Weight: 95 3 kg (210 lb)   Height: 6' 1" (1 854 m)     Body mass index is 27 71 kg/m²  Physical Exam  Vitals and nursing note reviewed  Constitutional:       Appearance: Normal appearance  He is well-developed and normal weight  Neck:      Thyroid: No thyromegaly  Cardiovascular:      Rate and Rhythm: Normal rate and regular rhythm  Pulses: no weak pulses     Heart sounds: Murmur heard  Pulmonary:      Effort: Pulmonary effort is normal  No respiratory distress  Breath sounds: Normal breath sounds  No wheezing  Musculoskeletal:      Cervical back: Normal range of motion and neck supple  Right lower leg: No edema  Left lower leg: No edema     Feet:      Right foot:      Skin integrity: No ulcer, skin breakdown, erythema, warmth, callus or dry skin  Left foot:      Skin integrity: No ulcer, skin breakdown, erythema, warmth, callus or dry skin  Lymphadenopathy:      Cervical: No cervical adenopathy  Neurological:      Mental Status: He is alert and oriented to person, place, and time  Cranial Nerves: No cranial nerve deficit  Psychiatric:         Mood and Affect: Mood normal          Behavior: Behavior normal          Thought Content: Thought content normal          Judgment: Judgment normal        Patient's shoes and socks removed  Right Foot/Ankle   Right Foot Inspection  Skin Exam: skin normal and skin intact  No dry skin, no warmth, no callus, no erythema, no maceration, no abnormal color, no pre-ulcer, no ulcer and no callus  Sensory   Proprioception: intact      Vascular  Capillary refills: < 3 seconds          Left Foot/Ankle  Left Foot Inspection  Skin Exam: skin normal and skin intact  No dry skin, no warmth, no erythema, no maceration, normal color, no pre-ulcer, no ulcer and no callus       Sensory   Proprioception: intact      Vascular  Capillary refills: < 3 seconds        Assign Risk Category  No deformity present  No loss of protective sensation  No weak pulses  Risk: 0

## 2023-02-02 NOTE — ASSESSMENT & PLAN NOTE
A1C done today and was 5 7  Continue metformin and pt told that he can decrease to 500 mg in AM and 1000 mg in dinner  Advised pt to continue a low carb diet and to exercise on a regular basis  Pt had eye exam in Sept 2021 by Dr Frida Forte  Foot exam done today        Lab Results   Component Value Date    HGBA1C 6 5 07/11/2022

## 2023-02-02 NOTE — ASSESSMENT & PLAN NOTE
LDL 74 and LFTs normal in Nov 2022  Continue simvastatin 80 mg qhs  Advised pt to follow a low cholesterol diet and to exercise on a regular basis

## 2023-04-26 ENCOUNTER — TELEPHONE (OUTPATIENT)
Dept: FAMILY MEDICINE CLINIC | Facility: CLINIC | Age: 73
End: 2023-04-26

## 2023-04-26 ENCOUNTER — CONSULT (OUTPATIENT)
Dept: CARDIOLOGY CLINIC | Facility: CLINIC | Age: 73
End: 2023-04-26

## 2023-04-26 VITALS
SYSTOLIC BLOOD PRESSURE: 130 MMHG | OXYGEN SATURATION: 98 % | HEART RATE: 67 BPM | BODY MASS INDEX: 27.05 KG/M2 | DIASTOLIC BLOOD PRESSURE: 66 MMHG | WEIGHT: 205 LBS

## 2023-04-26 DIAGNOSIS — I10 ESSENTIAL HYPERTENSION: ICD-10-CM

## 2023-04-26 DIAGNOSIS — I42.2 HYPERTROPHIC CARDIOMYOPATHY (HCC): Primary | ICD-10-CM

## 2023-04-26 DIAGNOSIS — E78.00 HYPERCHOLESTEROLEMIA: ICD-10-CM

## 2023-04-26 RX ORDER — LORAZEPAM 0.5 MG/1
0.5 TABLET ORAL
Qty: 2 TABLET | Refills: 0 | Status: SHIPPED | OUTPATIENT
Start: 2023-04-26

## 2023-04-26 NOTE — PROGRESS NOTES
Cardiology Consultation     Margot Levi  [id number]  1950  [facility] [address]  [facility] [id number] [id number] [phone]      1  Hypertrophic cardiomyopathy ([address] )  Ambulatory Referral to Cardiology    POCT ECG    metoprolol tartrate (LOPRESSOR) 25 mg tablet    MRI cardiac  w wo contrast    AMB extended holter monitor    LORazepam (Ativan) 0 5 mg tablet    Ambulatory Referral to Cardiology      2  Essential hypertension  POCT ECG      3  Hypercholesterolemia  POCT ECG          Discussion/Summary:    79-year-old man with history of hypertension  He presents after an echocardiogram showed findings of hypertrophic cardiomyopathy with evidence of LVOT obstruction at rest up to 60 mmHg with increased to 77 with Valsalva  He is asymptomatic, though he does not necessarily exert himself very heavily  He has no evidence of congestive heart failure  There is no family history that is known, although he is not very close with his siblings  Discussed the diagnosis in detail today with the patient and his wife showing him the echocardiogram  He has some knowledge of this diagnosis due to some sports figures with the diagnosis  With regards to further evaluation, I recommended a cardiac MRI and a 1 week Zio patch  We discussed his antihypertensives  He did previously been thinking of talking about changing some medications with his primary care physician because he has been very successful in losing some weight and wanted to ask if these medications were still appropriate  I recommended discontinuing the hydrochlorothiazide and instead starting a beta-blocker  Start 25 mg of metoprolol tartrate twice a day  I recommended further evaluation with our hypertrophic cardiomyopathy center after the testing is complete for further discussion and consideration   I discussed potential for ICD in the future if he has "certain features which are found  I have not scheduled follow-up with me, but depending on the findings on testing, the patient's discussion with the HCM clinic, and the patient's personal preference, he can continue to follow with them alone, with me, or a combination  History of Present Illness: This 79-year-old gentleman is referred to the office today by Dr Jyoti Keene after an abnormal echocardiogram     He has a murmur which she has been told about for several years  Patient tells me he had an echocardiogram done through [facility] a few years back, but was never told of any abnormalities  He has never seen a cardiologist before  Denies any symptoms or limitations, but it does not seem like he is generally extremely active  He does no formal exercise  His echocardiogram showed evidence of hypertrophic cardiomyopathy with a very thick septum, evidence of this folic anterior motion of the mitral valve and a LVOT gradient of 60 mmHg which increased to 77 with Valsalva  Mild to moderate MR  Mildly increased ascending aorta  No significant family history is disclosed  His parents passed at an elderly age his father from cancer, mother \"old age \"  Has 2 siblings  He is not in contact with them, so does not know their medical history  2 children around 36years old  Healthy  No family history of sudden cardiac death  Patient denies any illnesses or limitations in his childhood or his working years  Used to work for [employer] in the [employer] department in the 1960s is now retired      Patient Active Problem List   Diagnosis   • Essential hypertension   • Diabetes mellitus [facility] complication ([facility] )   • Hypercholesterolemia   • MARYAM (generalized anxiety disorder)   • RICHARD (obstructive sleep apnea)   • Hypertrophic cardiomyopathy ([facility] )     Past Medical History:   Diagnosis Date   • Diabetes mellitus ([facility] )    • Hypertension    • Low back pain    • Sleep disorder      Social History " Tobacco Use   • Smoking status: Never   • Smokeless tobacco: Never   Vaping Use   • Vaping Use: Never used   Substance Use Topics   • Alcohol use: Not Currently   • Drug use: Never      Family History   Problem Relation Age of Onset   • No Known Problems Mother    • No Known Problems Father      Past Surgical History:   Procedure Laterality Date   • TONSILLECTOMY         Current Outpatient Medications:   •  amLODIPine (NORVASC) 10 mg tablet, Take 1 tablet (10 mg total) by mouth daily, Disp: 30 tablet, Rfl: 5  •  benazepril (LOTENSIN) 40 MG tablet, Take 1 tablet (40 mg total) by mouth daily, Disp: 30 tablet, Rfl: 5  •  LORazepam (Ativan) 0 5 mg tablet, Take 1 tablet (0 5 mg total) by mouth once in imaging for anxiety (may repeat once if needed) for up to 2 doses, Disp: 2 tablet, Rfl: 0  •  metFORMIN (GLUCOPHAGE) 500 mg tablet, Take 2 tablets (1,000 mg total) by mouth 2 (two) times a day with meals, Disp: 120 tablet, Rfl: 5  •  metoprolol tartrate (LOPRESSOR) 25 mg tablet, Take 1 tablet (25 mg total) by mouth every 12 (twelve) hours, Disp: 180 tablet, Rfl: 3  •  simvastatin (ZOCOR) 80 mg tablet, Take 1 tablet (80 mg total) by mouth daily at bedtime, Disp: 30 tablet, Rfl: 5  No Known Allergies    Vitals:    04/26/23 1007   BP: 130/66   BP Location: Left arm   Patient Position: Sitting   Cuff Size: Standard   Pulse: 67   SpO2: 98%   Weight: 93 kg (205 lb)     Vitals:    04/26/23 1007   Weight: 93 kg (205 lb)          Body mass index is 27 05 kg/m²      Physical Exam:  GENERAL: Alert, well appearing, and in no distress  HEENT:  PERRL, EOMI, no scleral icterus, no conjunctival pallor  NECK:  Supple, No elevated JVP, no thyromegaly, no carotid bruits  HEART:  Regular + JANIE  LUNGS:  Clear to auscultation bilaterally  ABDOMEN:  Soft, non-tender, positive bowel sounds, no rebound or guarding  EXTREMITIES:  No edema  VASCULAR:  Normal pedal pulses   NEURO: No focal deficits,  SKIN: Normal without suspicious lesions on exposed skin      ROS:  Except as noted in HPI, is otherwise reviewed in detail and a 12 point review of systems is negative  Labs:  Lab Results   Component Value Date    SODIUM 140 11/14/2022    K 3 5 11/14/2022     11/14/2022    CREATININE 0 90 11/14/2022    BUN 17 11/14/2022    CO2 29 11/14/2022    ALT 15 11/14/2022    AST 18 11/14/2022    HGBA1C 5 7 02/02/2023    WBC 6 9 12/16/2021    HGB 15 7 12/16/2021    HCT 44 5 12/16/2021     12/16/2021       No results found for: CHOL  Lab Results   Component Value Date    HDL 51 11/14/2022    HDL 50 12/16/2021     Lab Results   Component Value Date    LDLCALC 74 11/14/2022    LDLCALC 100 (H) 12/16/2021     Lab Results   Component Value Date    TRIG 85 11/14/2022    TRIG 71 12/16/2021       Testing:  Echo:  •  Left Ventricle: Left ventricular cavity size is normal  Wall thickness is increased  There is severe asymmetric hypertrophy of the septal wall  The left ventricular ejection fraction is 65%  Systolic function is vigorous  Global longitudinal strain is reduced at 15%  Wall motion is normal  Diastolic function is mildly abnormal, consistent with grade I (abnormal) relaxation  There is  outflow tract dynamic obstruction at rest with a peak gradient of 60 mmgHg  There is outflow tract dynamic obstruction with valsalva with a peak gradient of 77 0 mmHg  •  Left Atrium: The atrium is mildly dilated  •  Aortic Valve: There is aortic sclerosis  •  Mitral Valve: There is mild diffuse thickening  The leaflets are elonaged  The leaflets exhibit normal mobility  There is mild to moderate regurgitation  There is systolic anterior motion of the anterior leaflet with late peaking gradient  •  Aorta: The ascending aorta is mildly dilated at 41mm      Findings appear consistent with hypertrophic cardiomyopathy      EKG:  Sinus rhythm  65 bpm  Low voltage in limb leads

## 2023-04-26 NOTE — TELEPHONE ENCOUNTER
Julien Lan is asking if we can find his echo test done 4-5 years ago to compare with more recent one done  I looked in both old charts (with Alicia Ryan and he DOES have records in 2 archives)  Unless I'm missing something, I don't see this result? ?

## 2023-04-26 NOTE — PATIENT INSTRUCTIONS
Hypertrophic Cardiomyopathy   AMBULATORY CARE:   Hypertrophic cardiomyopathy (HCM)  is a disease that causes heart muscle cells to become large  As the cells get larger, they cause the walls of your ventricles to become thick and stiff  The ventricles are the 2 lower chambers of your heart  They pump blood to your lungs and the rest of your body  When the ventricles are thick or stiff, your heart cannot fill with enough blood  This decreases the blood and oxygen supply to the rest of your body  HCM is usually inherited, but it may develop over time  High blood pressure, thyroid disease, or diabetes increase your risk for HCM  Signs and symptoms of HCM  may not develop, or you may have any of the following:  Chest pain    Shortness of breath especially during exercise    Feeling dizzy or fainting    Call your local emergency number (911 in the [address] [facility]) or have someone call if:   You have chest pain that may be worse when you take a deep breath or cough  You may cough up blood  You have a sudden cold sweat, especially with chest discomfort or trouble breathing  You feel very lightheaded or dizzy, especially with chest discomfort or trouble breathing  You have pain or discomfort in your back, neck, jaw, abdomen, or one or both of your arms  You have a severe headache or vision loss  You have weakness in an arm or leg  You are confused or have trouble speaking  You suddenly have trouble breathing  Call your cardiologist if:   You gain weight for no known reason  You feel weak or more tired than usual     You have increased swelling in your legs, ankles, feet, or abdomen  Your symptoms return or get worse  You feel like your heart is beating faster than normal, fluttering, or jumping in your chest     You urinate less than usual or not at all  You have questions or concerns about your condition or care  Treatment  depends on how much the disease has affected your health   The goal of treatment is to stop the problems caused by HCM and keep the disease from getting worse  You may need any of the following:  Blood thinners  help prevent blood clots  These include aspirin and warfarin  Take your medicine exactly as directed  Tell your healthcare provider if you forgot to take it or if you took too much  Blood thinners may cause you to bleed or bruise more easily  Use a soft toothbrush and an electric shaver  Wear medical alert jewelry or carry a card that says you take a blood thinner  Tell all healthcare providers, including your dentist, that you take this medicine  Heart medicine  helps strengthen or regulate your heartbeat  Antihypertensive medicine  helps lower your blood pressure  A controlled blood pressure helps protect your organs, such as your heart, lungs, brain, and kidneys  Take the medicine exactly as directed  An implantable cardioverter defibrillator (ICD)  is a small device that monitors your heart rate and rhythm  It is placed inside your chest or abdomen  An ICD can give a shock to your heart to make it start beating again  It can also make your heart beat faster or slower  Open heart surgery  may be needed to remove part of the thickened muscle that separates the left from right ventricle  You may also need to have a heart valve repaired or replaced so your heart can pump enough blood to your body  Heart valves allow blood flow between the chambers of your heart  Septal ablation  is a procedure to shrink part of the muscle and increase the amount of blood the heart can pump  A cardiac catheter is used to inject a solution of alcohol into the thickened part of the heart wall (septum)  Manage HCM:   Manage your other health conditions  Diabetes and high blood pressure that are not controlled will increase your risk for heart problems  Limit liquids  Talk to your healthcare provider about how much liquid to drink in a day   Your risk for fluid buildup and swelling increases if you drink too much  Your risk for dehydration increases if you do not drink enough liquid  Your heart has to work harder with too much or too little fluid  Eat heart-healthy foods  Heart-healthy foods help lower your risk for heart disease  Your provider may recommend the DASH eating plan  The DASH plan is low in sodium, processed sugar, unhealthy fats, and total fat  It is high in potassium, calcium, and fiber  These can be found in vegetables, fruit, and whole-grain foods  Eat a variety of fresh, frozen, or canned vegetables and fruits  Eat vegetables and fruits without creamy sauces or added salt and sugars  You can get more fiber by eating brown rice instead of white rice  Oatmeal and beans are also good sources of fiber  Eat healthy fats, such as peanut butter, nuts, tuna, and avocados  Avoid alcohol  Alcohol can increase your symptoms by causing dehydration and weight gain  Talk to your healthcare provider about exercise  Your healthcare provider will help you make a plan for exercise  He or she will tell you if you need to avoid certain exercises or activities  Do not smoke  Smoking weakens your heart and makes shortness of breath and other symptoms worse  If you smoke, it is never too late to quit  Ask your healthcare provider for information if you currently smoke and need help to quit  E-cigarettes or smokeless tobacco still contain nicotine  Talk to your healthcare provider before you use these products  Follow up with your doctor or cardiologist as directed: You may need to have tests to check your heart every 1 to 2 years  Write down your questions so you remember to ask them during your visits  © Copyright [employer] 2022 Information is for End User's use only and may not be sold, redistributed or otherwise used for commercial purposes  The above information is an  only   It is not intended as medical advice for individual conditions or treatments  Talk to your doctor, nurse or pharmacist before following any medical regimen to see if it is safe and effective for you

## 2023-04-27 NOTE — TELEPHONE ENCOUNTER
RE: the Echo he's looking for  He said he had it done at [facility]   Doesn't know who cardiologist was, said WE ordered it  He THINKS 4-5 years ago? ? He said Dr Katrina Robles is looking to compare

## 2023-04-28 ENCOUNTER — TELEPHONE (OUTPATIENT)
Dept: CARDIOLOGY CLINIC | Facility: CLINIC | Age: 73
End: 2023-04-28

## 2023-05-17 LAB
ALBUMIN SERPL-MCNC: 4.3 G/DL (ref 3.6–5.1)
ALBUMIN/CREAT UR: 9 MCG/MG CREAT
ALBUMIN/GLOB SERPL: 2 (CALC) (ref 1–2.5)
ALP SERPL-CCNC: 60 U/L (ref 35–144)
ALT SERPL-CCNC: 11 U/L (ref 9–46)
AST SERPL-CCNC: 16 U/L (ref 10–35)
BASOPHILS # BLD AUTO: 40 CELLS/UL (ref 0–200)
BASOPHILS NFR BLD AUTO: 0.6 %
BILIRUB SERPL-MCNC: 0.6 MG/DL (ref 0.2–1.2)
BUN SERPL-MCNC: 13 MG/DL (ref 7–25)
BUN/CREAT SERPL: NORMAL (CALC) (ref 6–22)
CALCIUM SERPL-MCNC: 9.4 MG/DL (ref 8.6–10.3)
CHLORIDE SERPL-SCNC: 104 MMOL/L (ref 98–110)
CHOLEST SERPL-MCNC: 138 MG/DL
CHOLEST/HDLC SERPL: 2.6 (CALC)
CO2 SERPL-SCNC: 29 MMOL/L (ref 20–32)
CREAT SERPL-MCNC: 0.85 MG/DL (ref 0.7–1.28)
CREAT UR-MCNC: 90 MG/DL (ref 20–320)
EOSINOPHIL # BLD AUTO: 132 CELLS/UL (ref 15–500)
EOSINOPHIL NFR BLD AUTO: 2 %
ERYTHROCYTE [DISTWIDTH] IN BLOOD BY AUTOMATED COUNT: 12.3 % (ref 11–15)
GFR/BSA.PRED SERPLBLD CYS-BASED-ARV: 92 ML/MIN/1.73M2
GLOBULIN SER CALC-MCNC: 2.2 G/DL (CALC) (ref 1.9–3.7)
GLUCOSE SERPL-MCNC: 99 MG/DL (ref 65–99)
HBA1C MFR BLD: 5.2 % OF TOTAL HGB
HCT VFR BLD AUTO: 42.1 % (ref 38.5–50)
HDLC SERPL-MCNC: 54 MG/DL
HGB BLD-MCNC: 14.6 G/DL (ref 13.2–17.1)
LDLC SERPL CALC-MCNC: 69 MG/DL (CALC)
LYMPHOCYTES # BLD AUTO: 2026 CELLS/UL (ref 850–3900)
LYMPHOCYTES NFR BLD AUTO: 30.7 %
MCH RBC QN AUTO: 30.4 PG (ref 27–33)
MCHC RBC AUTO-ENTMCNC: 34.7 G/DL (ref 32–36)
MCV RBC AUTO: 87.5 FL (ref 80–100)
MICROALBUMIN UR-MCNC: 0.8 MG/DL
MONOCYTES # BLD AUTO: 350 CELLS/UL (ref 200–950)
MONOCYTES NFR BLD AUTO: 5.3 %
NEUTROPHILS # BLD AUTO: 4052 CELLS/UL (ref 1500–7800)
NEUTROPHILS NFR BLD AUTO: 61.4 %
NONHDLC SERPL-MCNC: 84 MG/DL (CALC)
PLATELET # BLD AUTO: 176 THOUSAND/UL (ref 140–400)
PMV BLD REES-ECKER: 10.1 FL (ref 7.5–12.5)
POTASSIUM SERPL-SCNC: 3.8 MMOL/L (ref 3.5–5.3)
PROT SERPL-MCNC: 6.5 G/DL (ref 6.1–8.1)
PSA SERPL-MCNC: 3.1 NG/ML
RBC # BLD AUTO: 4.81 MILLION/UL (ref 4.2–5.8)
SODIUM SERPL-SCNC: 141 MMOL/L (ref 135–146)
TRIGL SERPL-MCNC: 66 MG/DL
WBC # BLD AUTO: 6.6 THOUSAND/UL (ref 3.8–10.8)

## 2023-05-22 DIAGNOSIS — I42.2 HYPERTROPHIC CARDIOMYOPATHY (HCC): ICD-10-CM

## 2023-06-01 ENCOUNTER — HOSPITAL ENCOUNTER (OUTPATIENT)
Dept: RADIOLOGY | Facility: HOSPITAL | Age: 73
Discharge: HOME | End: 2023-06-01

## 2023-06-01 ENCOUNTER — HOSPITAL ENCOUNTER (OUTPATIENT)
Dept: MRI IMAGING | Facility: HOSPITAL | Age: 73
Discharge: HOME | End: 2023-06-01
Attending: INTERNAL MEDICINE

## 2023-06-01 DIAGNOSIS — I42.2 HYPERTROPHIC CARDIOMYOPATHY (HCC): ICD-10-CM

## 2023-06-01 RX ADMIN — GADOBUTROL 18 ML: 604.72 INJECTION INTRAVENOUS at 12:36

## 2023-07-09 DIAGNOSIS — I10 ESSENTIAL HYPERTENSION: ICD-10-CM

## 2023-07-09 DIAGNOSIS — E78.5 HYPERLIPIDEMIA, UNSPECIFIED HYPERLIPIDEMIA TYPE: ICD-10-CM

## 2023-07-10 RX ORDER — AMLODIPINE BESYLATE 10 MG/1
TABLET ORAL
Qty: 30 TABLET | Refills: 5 | Status: SHIPPED | OUTPATIENT
Start: 2023-07-10

## 2023-07-10 RX ORDER — BENAZEPRIL HYDROCHLORIDE 40 MG/1
TABLET, FILM COATED ORAL
Qty: 30 TABLET | Refills: 5 | Status: SHIPPED | OUTPATIENT
Start: 2023-07-10

## 2023-07-10 RX ORDER — SIMVASTATIN 80 MG
TABLET ORAL
Qty: 30 TABLET | Refills: 5 | Status: SHIPPED | OUTPATIENT
Start: 2023-07-10

## 2023-08-03 ENCOUNTER — OFFICE VISIT (OUTPATIENT)
Dept: FAMILY MEDICINE CLINIC | Facility: CLINIC | Age: 73
End: 2023-08-03
Payer: COMMERCIAL

## 2023-08-03 VITALS
DIASTOLIC BLOOD PRESSURE: 64 MMHG | WEIGHT: 201 LBS | BODY MASS INDEX: 26.64 KG/M2 | RESPIRATION RATE: 16 BRPM | HEIGHT: 73 IN | HEART RATE: 58 BPM | OXYGEN SATURATION: 98 % | SYSTOLIC BLOOD PRESSURE: 128 MMHG

## 2023-08-03 DIAGNOSIS — I10 ESSENTIAL HYPERTENSION: ICD-10-CM

## 2023-08-03 DIAGNOSIS — Z00.00 MEDICARE ANNUAL WELLNESS VISIT, SUBSEQUENT: Primary | ICD-10-CM

## 2023-08-03 DIAGNOSIS — E78.00 HYPERCHOLESTEROLEMIA: ICD-10-CM

## 2023-08-03 DIAGNOSIS — E11.9 DIABETES MELLITUS WITHOUT COMPLICATION (HCC): ICD-10-CM

## 2023-08-03 DIAGNOSIS — I42.2 HYPERTROPHIC CARDIOMYOPATHY (HCC): ICD-10-CM

## 2023-08-03 PROCEDURE — 99214 OFFICE O/P EST MOD 30 MIN: CPT | Performed by: FAMILY MEDICINE

## 2023-08-03 PROCEDURE — G0439 PPPS, SUBSEQ VISIT: HCPCS | Performed by: FAMILY MEDICINE

## 2023-08-03 NOTE — ASSESSMENT & PLAN NOTE
A1C 5.2 in May 2023. Stop AM metformin and pt told that he can decrease to 500 mg with dinner. Advised pt to continue a low carb diet and to exercise on a regular basis. Pt had eye exam in Sept 2021 by Dr Jeff Edge. Foot exam done in Feb 2023. Urine albumin/creatinine ratio done in May 2023.       Lab Results   Component Value Date    HGBA1C 5.2 05/16/2023

## 2023-08-03 NOTE — PATIENT INSTRUCTIONS
Medicare Preventive Visit Patient Instructions  Thank you for completing your Welcome to Medicare Visit or Medicare Annual Wellness Visit today. Your next wellness visit will be due in one year (8/3/2024). The screening/preventive services that you may require over the next 5-10 years are detailed below. Some tests may not apply to you based off risk factors and/or age. Screening tests ordered at today's visit but not completed yet may show as past due. Also, please note that scanned in results may not display below. Preventive Screenings:  Service Recommendations Previous Testing/Comments   Colorectal Cancer Screening  · Colonoscopy    · Fecal Occult Blood Test (FOBT)/Fecal Immunochemical Test (FIT)  · Fecal DNA/Cologuard Test  · Flexible Sigmoidoscopy Age: 43-73 years old   Colonoscopy: every 10 years (May be performed more frequently if at higher risk)  OR  FOBT/FIT: every 1 year  OR  Cologuard: every 3 years  OR  Sigmoidoscopy: every 5 years  Screening may be recommended earlier than age 39 if at higher risk for colorectal cancer. Also, an individualized decision between you and your healthcare provider will decide whether screening between the ages of 77-80 would be appropriate.  Colonoscopy: Not on file  FOBT/FIT: Not on file  Cologuard: Not on file  Sigmoidoscopy: Not on file          Prostate Cancer Screening Individualized decision between patient and health care provider in men between ages of 53-66   Medicare will cover every 12 months beginning on the day after your 50th birthday PSA: 3.10 ng/mL     Screening Current     Hepatitis C Screening Once for adults born between [address] [place] [address]  More frequently in patients at high risk for Hepatitis C Hep C Antibody: Not on file        Diabetes Screening 1-2 times per year if you're at risk for diabetes or have pre-diabetes Fasting glucose: No results in last 5 years (No results in last 5 years)  A1C: 5.2 % of total Hgb (5/16/2023)  Screening Not Indicated  History Diabetes   Cholesterol Screening Once every 5 years if you don't have a lipid disorder. May order more often based on risk factors. Lipid panel: 05/16/2023  Screening Not Indicated  History Lipid Disorder      Other Preventive Screenings Covered by Medicare:  1. Abdominal Aortic Aneurysm (AAA) Screening: covered once if your at risk. You're considered to be at risk if you have a family history of AAA or a male between the age of 70-76 who smoking at least 100 cigarettes in your lifetime. 2. Lung Cancer Screening: covers low dose CT scan once per year if you meet all of the following conditions: (1) Age 48-67; (2) No signs or symptoms of lung cancer; (3) Current smoker or have quit smoking within the last 15 years; (4) You have a tobacco smoking history of at least 20 pack years (packs per day x number of years you smoked); (5) You get a written order from a healthcare provider. 3. Glaucoma Screening: covered annually if you're considered high risk: (1) You have diabetes OR (2) Family history of glaucoma OR (3)  aged 48 and older OR (3)  American aged 72 and older  3. Osteoporosis Screening: covered every 2 years if you meet one of the following conditions: (1) Have a vertebral abnormality; (2) On glucocorticoid therapy for more than 3 months; (3) Have primary hyperparathyroidism; (4) On osteoporosis medications and need to assess response to drug therapy. 5. HIV Screening: covered annually if you're between the age of 14-79. Also covered annually if you are younger than 13 and older than 72 with risk factors for HIV infection. For pregnant patients, it is covered up to 3 times per pregnancy.     Immunizations:  Immunization Recommendations   Influenza Vaccine Annual influenza vaccination during flu season is recommended for all persons aged >= 6 months who do not have contraindications   Pneumococcal Vaccine   * Pneumococcal conjugate vaccine = PCV13 (Prevnar 13), PCV15 (Vaxneuvance), PCV20 (Prevnar 20)  * Pneumococcal polysaccharide vaccine = PPSV23 (Pneumovax) Adults 2364 years old: 1-3 doses may be recommended based on certain risk factors  Adults 72 years old: 1-2 doses may be recommended based off what pneumonia vaccine you previously received   Hepatitis B Vaccine 3 dose series if at intermediate or high risk (ex: diabetes, end stage renal disease, liver disease)   Tetanus (Td) Vaccine - COST NOT COVERED BY MEDICARE PART B Following completion of primary series, a booster dose should be given every 10 years to maintain immunity against tetanus. Td may also be given as tetanus wound prophylaxis. Tdap Vaccine - COST NOT COVERED BY MEDICARE PART B Recommended at least once for all adults. For pregnant patients, recommended with each pregnancy. Shingles Vaccine (Shingrix) - COST NOT COVERED BY MEDICARE PART B  2 shot series recommended in those aged 48 and above     Health Maintenance Due:      Topic Date Due   • Hepatitis C Screening  Never done   • Colorectal Cancer Screening  Never done     Immunizations Due:      Topic Date Due   • COVID-19 Vaccine (1) Never done   • Influenza Vaccine (1) 09/01/2023     Advance Directives   What are advance directives? Advance directives are legal documents that state your wishes and plans for medical care. These plans are made ahead of time in case you lose your ability to make decisions for yourself. Advance directives can apply to any medical decision, such as the treatments you want, and if you want to donate organs. What are the types of advance directives? There are many types of advance directives, and each state has rules about how to use them. You may choose a combination of any of the following:  · Living will: This is a written record of the treatment you want. You can also choose which treatments you do not want, which to limit, and which to stop at a certain time.  This includes surgery, medicine, IV fluid, and tube feedings. · Durable power of  for healthcare [facility]): This is a written record that states who you want to make healthcare choices for you when you are unable to make them for yourself. This person, called a proxy, is usually a family member or a friend. You may choose more than 1 proxy. · Do not resuscitate (DNR) order:  A DNR order is used in case your heart stops beating or you stop breathing. It is a request not to have certain forms of treatment, such as CPR. A DNR order may be included in other types of advance directives. · Medical directive: This covers the care that you want if you are in a coma, near death, or unable to make decisions for yourself. You can list the treatments you want for each condition. Treatment may include pain medicine, surgery, blood transfusions, dialysis, IV or tube feedings, and a ventilator (breathing machine). · Values history: This document has questions about your views, beliefs, and how you feel and think about life. This information can help others choose the care that you would choose. Why are advance directives important? An advance directive helps you control your care. Although spoken wishes may be used, it is better to have your wishes written down. Spoken wishes can be misunderstood, or not followed. Treatments may be given even if you do not want them. An advance directive may make it easier for your family to make difficult choices about your care. Fall Prevention    Fall prevention  includes ways to make your home and other areas safer. It also includes ways you can move more carefully to prevent a fall. Health conditions that cause changes in your blood pressure, vision, or muscle strength and coordination may increase your risk for falls. Medicines may also increase your risk for falls if they make you dizzy, weak, or sleepy. Fall prevention tips:   · Stand or sit up slowly. · Use assistive devices as directed.     · Wear shoes that fit well and have soles that . · Wear a personal alarm. · Stay active. · Manage your medical conditions. Home Safety Tips:  · Add items to prevent falls in the bathroom. · Keep paths clear. · Install bright lights in your home. · Keep items you use often on shelves within reach. · Paint or place reflective tape on the edges of your stairs. Weight Management   Why it is important to manage your weight:  Being overweight increases your risk of health conditions such as heart disease, high blood pressure, type 2 diabetes, and certain types of cancer. It can also increase your risk for osteoarthritis, sleep apnea, and other respiratory problems. Aim for a slow, steady weight loss. Even a small amount of weight loss can lower your risk of health problems. How to lose weight safely:  A safe and healthy way to lose weight is to eat fewer calories and get regular exercise. You can lose up about 1 pound a week by decreasing the number of calories you eat by 500 calories each day. Healthy meal plan for weight management:  A healthy meal plan includes a variety of foods, contains fewer calories, and helps you stay healthy. A healthy meal plan includes the following:  · Eat whole-grain foods more often. A healthy meal plan should contain fiber. Fiber is the part of grains, fruits, and vegetables that is not broken down by your body. Whole-grain foods are healthy and provide extra fiber in your diet. Some examples of whole-grain foods are whole-wheat breads and pastas, oatmeal, brown rice, and bulgur. · Eat a variety of vegetables every day. Include dark, leafy greens such as spinach, kale, maria alejandra greens, and mustard greens. Eat yellow and orange vegetables such as carrots, sweet potatoes, and winter squash. · Eat a variety of fruits every day. Choose fresh or canned fruit (canned in its own juice or light syrup) instead of juice. Fruit juice has very little or no fiber.   · Eat low-fat dairy foods.  Drink fat-free (skim) milk or 1% milk. Eat fat-free yogurt and low-fat cottage cheese. Try low-fat cheeses such as mozzarella and other reduced-fat cheeses. · Choose meat and other protein foods that are low in fat. Choose beans or other legumes such as split peas or lentils. Choose fish, skinless poultry (chicken or turkey), or lean cuts of red meat (beef or pork). Before you cook meat or poultry, cut off any visible fat. · Use less fat and oil. Try baking foods instead of frying them. Add less fat, such as margarine, sour cream, regular salad dressing and mayonnaise to foods. Eat fewer high-fat foods. Some examples of high-fat foods include french fries, doughnuts, ice cream, and cakes. · Eat fewer sweets. Limit foods and drinks that are high in sugar. This includes candy, cookies, regular soda, and sweetened drinks. Exercise:  Exercise at least 30 minutes per day on most days of the week. Some examples of exercise include walking, biking, dancing, and swimming. You can also fit in more physical activity by taking the stairs instead of the elevator or parking farther away from stores. Ask your healthcare provider about the best exercise plan for you. © Copyright [employer] 2018 Information is for End User's use only and may not be sold, redistributed or otherwise used for commercial purposes.  All illustrations and images included in CareNotes® are the copyrighted property of A.D.A.M., Inc. or  Saeed

## 2023-08-03 NOTE — ASSESSMENT & PLAN NOTE
BP great. Continue amlodipine 10 mg qd, benazepril 40 mg qd, and metoprolol 25 mg twice a day. Pt advised to continue low Na diet and to exercise on a regular basis.

## 2023-08-03 NOTE — PROGRESS NOTES
Assessment and Plan:     Problem List Items Addressed This Visit        Endocrine    Diabetes mellitus without complication ([address])     B1X 5.2 in May 2023. Stop AM metformin and pt told that he can decrease to 500 mg with dinner. Advised pt to continue a low carb diet and to exercise on a regular basis. Pt had eye exam in Sept 2021 by Dr Jeff Edge. Foot exam done in Feb 2023. Urine albumin/creatinine ratio done in May 2023. Lab Results   Component Value Date    HGBA1C 5.2 05/16/2023               Cardiovascular and Mediastinum    Hypertrophic cardiomyopathy [facility])     Patient saw Cardiology in April 2023 and had MRI heart in June 2023. HCTZ was stopped and patient started on metoprolol 25 mg twice a day. Patient also referred to [address] [place] [address] and has appointment there next week. Essential hypertension     BP great. Continue amlodipine 10 mg qd, benazepril 40 mg qd, and metoprolol 25 mg twice a day. Pt advised to continue low Na diet and to exercise on a regular basis. Other    Medicare annual wellness visit, subsequent - Primary     Wellness exam done. Last Tdap was in 2012. Pt had prevnar 13 and pneumovacc 23. Recommend COVID vaccines, flu shot, and Shingrix. Labs are UTD. Pt advised again to schedule colonoscopy. No recent falls. Mood good. Hypercholesterolemia     LDL 69 and LFTs normal in May 2023. Continue simvastatin 80 mg qhs. Advised pt to follow a low cholesterol diet and to exercise on a regular basis. Depression Screening and Follow-up Plan: Patient was screened for depression during today's encounter. They screened negative with a PHQ-2 score of 0. Preventive health issues were discussed with patient, and age appropriate screening tests were ordered as noted in patient's After Visit Summary. Personalized health advice and appropriate referrals for health education or preventive services given if needed, as noted in patient's After Visit Summary.      History of Present Illness:     Patient presents for a Medicare Wellness Visit    Patient here for follow-up Hypertension, Hyperlipidemia, DM, Hypertrophic Cardiomyopathy. Patient also due for wellness exam. Patient doing well. No chest pain or shortness of breath. Saw Cardiology in April 2023 and had MRI heart in June 2023. Going to [address] [place] [address] next week. Blood pressure has been ok and sugars good. Follows low carb diet. Patient Care Team:  Myah Romero MD as PCP - General (Family Medicine)  Myah Romero MD as PCP - [address] [place] [address])     Review of Systems:     Review of Systems   Constitutional: Negative for fatigue and unexpected weight change. Eyes: Negative for visual disturbance. Respiratory: Negative for chest tightness and shortness of breath. Cardiovascular: Negative for chest pain and palpitations. Gastrointestinal: Negative for abdominal pain, constipation, diarrhea, nausea and vomiting. Endocrine: Negative for polydipsia, polyphagia and polyuria. Genitourinary: Negative for frequency. Musculoskeletal: Negative for arthralgias. Skin: Negative for rash and wound. Neurological: Negative for numbness.         Problem List:     Patient Active Problem List   Diagnosis   • Essential hypertension   • Diabetes mellitus without complication ([address])   • Hypercholesterolemia   • MARYAM (generalized anxiety disorder)   • RICHARD (obstructive sleep apnea)   • Medicare annual wellness visit, subsequent   • Hypertrophic cardiomyopathy ([address])      Past Medical and Surgical History:     Past Medical History:   Diagnosis Date   • Diabetes mellitus ([address])    • Hypertension    • Low back pain    • Sleep disorder      Past Surgical History:   Procedure Laterality Date   • TONSILLECTOMY        Family History:     Family History   Problem Relation Age of Onset   • No Known Problems Mother    • No Known Problems Father       Social History:     Social History     Socioeconomic History   • Marital status: /Civil [employer]     Spouse name: None   • Number of children: None   • Years of education: None   • Highest education level: None   Occupational History   • None   Tobacco Use   • Smoking status: Never   • Smokeless tobacco: Never   Vaping Use   • Vaping Use: Never used   Substance and Sexual Activity   • Alcohol use: Not Currently   • Drug use: Never   • Sexual activity: Not Currently   Other Topics Concern   • None   Social History Narrative    Most recent tobacco use screenin2019     Social Determinants of Health     Financial Resource Strain: Low Risk  (8/3/2023)    Overall Financial Resource Strain (CARDIA)    • Difficulty of Paying Living Expenses: Not hard at all   Food Insecurity: Not on file   Transportation Needs: No Transportation Needs (8/3/2023)    PRAPARE - Transportation    • Lack of Transportation (Medical): No    • Lack of Transportation (Non-Medical):  No   Physical Activity: Not on file   Stress: Not on file   Social Connections: Not on file   Intimate Partner Violence: Not on file   Housing Stability: Not on file      Medications and Allergies:     Current Outpatient Medications   Medication Sig Dispense Refill   • amLODIPine (NORVASC) 10 mg tablet TAKE 1 TABLET BY MOUTH DAILY 30 tablet 5   • benazepril (LOTENSIN) 40 MG tablet TAKE 1 TABLET BY MOUTH DAILY 30 tablet 5   • metFORMIN (GLUCOPHAGE) 500 mg tablet Take 2 tablets (1,000 mg total) by mouth 2 (two) times a day with meals (Patient taking differently: Take 1,000 mg by mouth 2 (two) times a day with meals 1 in am and 2 at dinner time) 120 tablet 5   • metoprolol tartrate (LOPRESSOR) 25 mg tablet Take 1 tablet (25 mg total) by mouth every 12 (twelve) hours 180 tablet 3   • simvastatin (ZOCOR) 80 mg tablet TAKE 1 TABLET BY MOUTH DAILY AT BEDTIME 30 tablet 5   • LORazepam (Ativan) 0.5 mg tablet Take 1 tablet (0.5 mg total) by mouth once in imaging for anxiety (may repeat once if needed) for up to 2 doses (Patient not taking: Reported on 8/3/2023) 2 tablet 0     No current facility-administered medications for this visit. No Known Allergies   Immunizations:     Immunization History   Administered Date(s) Administered   • Pneumococcal Conjugate 13-Valent 10/19/2015   • Pneumococcal Polysaccharide PPV23 10/14/2014   • Tdap 05/21/2012      Health Maintenance:         Topic Date Due   • Hepatitis C Screening  Never done   • Colorectal Cancer Screening  Never done         Topic Date Due   • COVID-19 Vaccine (1) Never done   • Influenza Vaccine (1) 09/01/2023      Medicare Screening Tests and Risk Assessments:     Orlin Luz is here for his Subsequent Wellness visit. Health Risk Assessment:   Patient rates overall health as very good. Patient feels that their physical health rating is slightly better. Patient is satisfied with their life. Eyesight was rated as slightly worse. Hearing was rated as same. Patient feels that their emotional and mental health rating is slightly better. Patients states they are sometimes angry. Patient states they are sometimes unusually tired/fatigued. Pain experienced in the last 7 days has been some. Patient's pain rating has been 3/10. Patient states that he has experienced weight loss or gain in last 6 months. Left knee    Depression Screening:   PHQ-2 Score: 0      Fall Risk Screening: In the past year, patient has experienced: history of falling in past year    Number of falls: 1  Injured during fall?: No    Feels unsteady when standing or walking?: No    Worried about falling?: No      Home Safety:  Patient does not have trouble with stairs inside or outside of their home. Patient has working smoke alarms and has working carbon monoxide detector. Home safety hazards include: none. Nutrition:   Current diet is Low Carb. Medications:   Patient is currently taking over-the-counter supplements. OTC medications include: see medication list. Patient is able to manage medications.      Activities of Daily Living (ADLs)/Instrumental Activities of Daily Living (IADLs):   Walk and transfer into and out of bed and chair?: Yes  Dress and groom yourself?: Yes    Bathe or shower yourself?: Yes    Feed yourself? Yes  Do your laundry/housekeeping?: Yes  Manage your money, pay your bills and track your expenses?: Yes  Make your own meals?: Yes    Do your own shopping?: Yes    Previous Hospitalizations:   Any hospitalizations or ED visits within the last 12 months?: No      Advance Care Planning:   Living will: No    Durable POA for healthcare: No    Advanced directive: No    Advanced directive counseling given: Yes    Five wishes given: No    Patient declined ACP directive: Yes      Cognitive Screening:   Provider or family/friend/caregiver concerned regarding cognition?: No    PREVENTIVE SCREENINGS      Cardiovascular Screening:    General: Screening Not Indicated and History Lipid Disorder      Diabetes Screening:     General: Screening Not Indicated and History Diabetes      Prostate Cancer Screening:    General: Screening Current      Abdominal Aortic Aneurysm (AAA) Screening:    Risk factors include: age between 70-75 yo        Lung Cancer Screening:     General: Screening Not Indicated    Screening, Brief Intervention, and Referral to Treatment (SBIRT)    Screening  Typical number of drinks in a day: 0  Typical number of drinks in a week: 0  Interpretation: Low risk drinking behavior. AUDIT-C Screenin) How often did you have a drink containing alcohol in the past year? never  2) How many drinks did you have on a typical day when you were drinking in the past year? 0  3) How often did you have 6 or more drinks on one occasion in the past year? never    AUDIT-C Score: 0  Interpretation: Score 0-3 (male): Negative screen for alcohol misuse    Single Item Drug Screening:  How often have you used an illegal drug (including marijuana) or a prescription medication for non-medical reasons in the past year? never    Single Item Drug Screen Score: 0  Interpretation: Negative screen for possible drug use disorder    No results found. Physical Exam:     /64 (BP Location: Left arm, Patient Position: Sitting, Cuff Size: Standard)   Pulse 58   Resp 16   Ht 6' 1" (1.854 m)   Wt 91.2 kg (201 lb)   SpO2 98%   BMI 26.52 kg/m²     Physical Exam  Vitals and nursing note reviewed. Constitutional:       Appearance: Normal appearance. Neck:      Vascular: No carotid bruit. Cardiovascular:      Rate and Rhythm: Normal rate and regular rhythm. Heart sounds: Murmur heard. Pulmonary:      Effort: Pulmonary effort is normal.      Breath sounds: Normal breath sounds. No wheezing. Musculoskeletal:      Cervical back: Normal range of motion and neck supple. No muscular tenderness. Right lower leg: No edema. Left lower leg: No edema. Feet:      Right foot:      Skin integrity: No ulcer, skin breakdown, erythema, warmth, callus or dry skin. Left foot:      Skin integrity: No ulcer, skin breakdown, erythema, warmth, callus or dry skin. Lymphadenopathy:      Cervical: No cervical adenopathy. Neurological:      Mental Status: He is alert. Psychiatric:         Mood and Affect: Mood normal.         Behavior: Behavior normal.         Thought Content:  Thought content normal.         Judgment: Judgment normal.          Nghia King MD

## 2023-08-03 NOTE — ASSESSMENT & PLAN NOTE
Wellness exam done. Last Tdap was in 2012. Pt had prevnar 13 and pneumovacc 23. Recommend COVID vaccines, flu shot, and Shingrix. Labs are UTD. Pt advised again to schedule colonoscopy. No recent falls. Mood good.

## 2023-08-03 NOTE — ASSESSMENT & PLAN NOTE
LDL 69 and LFTs normal in May 2023. Continue simvastatin 80 mg qhs. Advised pt to follow a low cholesterol diet and to exercise on a regular basis.

## 2023-08-03 NOTE — ASSESSMENT & PLAN NOTE
Patient saw Cardiology in April 2023 and had MRI heart in June 2023. HCTZ was stopped and patient started on metoprolol 25 mg twice a day. Patient also referred to [address] [place] [address] and has appointment there next week.

## 2023-08-07 NOTE — PROGRESS NOTES
HCM Consultation - Cardiology   Chrisr Denis 67 y.o. male MRN: [id number]  Unit/Bed#:  Encounter: [id number]    Patient Active Problem List    Diagnosis Date Noted   • Hypertrophic cardiomyopathy ([address]) 12/15/2022   • Medicare annual wellness visit, subsequent 01/29/2021   • Essential hypertension 01/23/2021   • Diabetes mellitus without complication ([address]) 54/77/0271   • Hypercholesterolemia 01/23/2021   • MARYAM (generalized anxiety disorder) 01/23/2021   • RICHARD (obstructive sleep apnea) 01/23/2021     Plan: Mr Alma Benavidez has obstructive hypertrophic cardiomyopathy. Today he notes he overall feels well. He is very active at home and has no cardiac complaints. With regards to his overall health, he has lost weight over the past year having lost almost 15 lbs since February alone and he has done this by significantly reducing carbohydrates and total calories in his diet. His HgbA1c has reduced to 5.2 from 6.5 in July 2022 and he notes his PCP has discussed taking him off of his metformin given his weight loss which has helped with his HgbA1c reduction. His blood pressure today is 135/89 and he states this weight loss has helped his readings. He remains on amlodipine 10 mg daily, benazapril 40 mg daily and metoprolol tartrate 25 mg twice daily which we will continue for now. He follows a low sodium diet. Recent Lipid panel shows triglycerides 66, HDL 54 and LDL 69 and he takes simvastatin 80 mg daily. He has presumed RICHARD given history of snoring but he has had no formal sleep study. Since he has lost weight he feels his snoring has reduced significantly. He also feels his weight loss have given him more energy. 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 To complete his risk stratification for HCM we will order exercise bike stress echo and we will also order genetic testing. We spoke in detail about HCM, exercise recommendations, life style modification, hydration and possible need to switch him from vasodilators to other antihypertensive medications after completion of risk stratification. At this time, he has no indication for primary prevention ICD. Educational material and contact information were provided to the patient. Physician Requesting Consult: Sujit Cartwright MD  Reason for Consult / Principal Problem: HCM    HPI: Mr Viktor Cooney is a 67year old man with past cardiovascular history of hypertension and hypercholesterolemia who was seen by Dr Ernesto Wharton for evaluation of newly diagnosed HCM phenotype on echocardiography. He does also have history of diabetes and obstructive sleep apnea. The index echocardiogram was performed on 11- and showed:    •  Left Ventricle: Left ventricular cavity size is normal. Wall thickness is increased. There is severe asymmetric hypertrophy of the septal wall. The left ventricular ejection fraction is 65%. Systolic function is vigorous. Global longitudinal strain is reduced at 15%. Wall motion is normal. Diastolic function is mildly abnormal, consistent with grade I (abnormal) relaxation. There is  outflow tract dynamic obstruction at rest with a peak gradient of 60 mmgHg. There is outflow tract dynamic obstruction with valsalva with a peak gradient of 77.0 mmHg. •  Left Atrium: The atrium is mildly dilated. •  Aortic Valve: There is aortic sclerosis. •  Mitral Valve: There is mild diffuse thickening. The leaflets are elonaged. The leaflets exhibit normal mobility. There is mild to moderate regurgitation. There is systolic anterior motion of the anterior leaflet with late peaking gradient. •  Aorta: The ascending aorta is mildly dilated at 41mm. Review of the study shows a maximum wall thickness of 26 mm in basal septum, bi-leaflet DAYA and mild mitral regurgitation. CMR performed on 6-1-2023 showed: Impression:  1. 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 Basal septum measures up to 21 mm.  2. Normal right ventricular size and systolic function. 3. Moderately dilated left atrium. Mitral regurgitation visualized. 4. Interstitial and replacement fibrosis in the hypertrophied segments involving approximately 10% of the total myocardium        A 7-day extended monitor (5-5 to 5-) showed:    Patient had a min HR of 38 bpm, max HR of 187 bpm, and avg HR of 66 bpm. Predominant underlying rhythm was Sinus Rhythm. First Degree AV Block was present. 3 Ventricular Tachycardia runs occurred, the run with the fastest interval lasting 4 beats with a max rate of 187 bpm, the longest lasting 5 beats with an avg rate of 116 bpm. 5 Supraventricular Tachycardia runs occurred, the run with the  fastest interval lasting 13 beats with a max rate of 119 bpm (avg 105 bpm); the run with the fastest interval was also the longest. Isolated SVEs were rare (<1.0%), SVE Couplets were rare (<1.0%), and SVE Triplets were rare (<1.0%). Isolated VEs were rare (<1.0%, 1659), VE Couplets were rare (<1.0%, 32), and VE Triplets were rare (<1.0%, 4). Ventricular Bigeminy was present.     Risk stratification:  Nonsustained ventricular tachycardia-no  Severe left ventricular hypertrophy-no  Family history of sudden death - no  Unexplained syncope-no  LVOT obstruction-yes  Atrial fibrillation and left atrial dilation-yes  Age- 67years old  NYHA- class I  Myocardial fibrosis-Interstitial and replacement fibrosis in the hypertrophied segments involving approximately 10% of the total myocardium  LV systolic dysfunction-no  Apical aneurysm-no    Review of systems: denies chest pain, dyspnea on exertion and no palpitations; no LE edema/orthopnea and no dizziness/lightheadedness    Family history: Father passed away at age 80 from cancer (patient is unsure what kind) did have a "heart condition" but patient does not know the actual diagnosis; Mother passed away at age 80years old but patient is not sure the cause; he has a sister who is 80years old and two brothers who are 78and 79years old but he is not sure of their status as they are estranged; he has a daughter who is 44years old and son is 45years old both with no medical issues; he has two granddaughters; he has no known SCD in his family    Genetic testing: will be ordered after this visit    Devices: none    Historical Information   Past Medical History:   Diagnosis Date   • Diabetes mellitus ([address])    • Hypertension    • Low back pain    • Sleep disorder      Past Surgical History:   Procedure Laterality Date   • TONSILLECTOMY       Family History   Problem Relation Age of Onset   • No Known Problems Mother    • No Known Problems Father      Current Outpatient Medications on File Prior to Visit   Medication Sig Dispense Refill   • amLODIPine (NORVASC) 10 mg tablet TAKE 1 TABLET BY MOUTH DAILY 30 tablet 5   • benazepril (LOTENSIN) 40 MG tablet TAKE 1 TABLET BY MOUTH DAILY 30 tablet 5   • metFORMIN (GLUCOPHAGE) 500 mg tablet Take 2 tablets (1,000 mg total) by mouth 2 (two) times a day with meals (Patient taking differently: Take 1,000 mg by mouth 2 (two) times a day with meals 1 in am and 2 at dinner time) 120 tablet 5   • metoprolol tartrate (LOPRESSOR) 25 mg tablet Take 1 tablet (25 mg total) by mouth every 12 (twelve) hours 180 tablet 3   • simvastatin (ZOCOR) 80 mg tablet TAKE 1 TABLET BY MOUTH DAILY AT BEDTIME 30 tablet 5   • LORazepam (Ativan) 0.5 mg tablet Take 1 tablet (0.5 mg total) by mouth once in imaging for anxiety (may repeat once if needed) for up to 2 doses (Patient not taking: Reported on 8/3/2023) 2 tablet 0     No current facility-administered medications on file prior to visit.      No Known Allergies  Social History     Substance and Sexual Activity   Alcohol Use Not Currently     Social History     Substance and Sexual Activity   Drug Use Never     Social History     Tobacco Use   Smoking Status Never   Smokeless Tobacco Never     Objective   Vitals: Blood pressure 135/89, pulse 69, height 6' 1" (1.854 m), weight 88.5 kg (195 lb), SpO2 99 %. , Body mass index is 25.73 kg/m². ,   Vitals:    08/08/23 1040   BP: 135/89   BP Location: Left arm   Patient Position: Sitting   Cuff Size: Large   Pulse: 69   SpO2: 99%   Weight: 88.5 kg (195 lb)   Height: 6' 1" (1.854 m)      Body mass index is 25.73 kg/m². Body surface area is 2.13 meters squared.       Invasive Devices     Peripheral Intravenous Line  Duration           Peripheral IV Left;Ventral (anterior) Wrist -- days              Physical Exam:  GEN: Tania Burger appears well, alert and oriented x 3, pleasant and cooperative   HEENT: pupils equal, round, and reactive to light; extraocular muscles intact  NECK: supple, no carotid bruits   HEART: regular rhythm, normal S1 and S2, 3/6 murmur noted, no clicks, gallops or rubs   LUNGS: clear to auscultation bilaterally; no wheezes, rales, or rhonchi   ABDOMEN: normal bowel sounds, soft, no tenderness, no distention  EXTREMITIES: peripheral pulses normal; no clubbing, cyanosis, or edema  NEURO: no focal findings   SKIN: normal without suspicious lesions on exposed skin    Lab Results:   Lab Results   Component Value Date    WBC 6.6 05/16/2023    RBC 4.81 05/16/2023    HGB 14.6 05/16/2023    HCT 42.1 05/16/2023    MCV 87.5 05/16/2023     05/16/2023    RDW 12.3 05/16/2023     Lab Results   Component Value Date    K 3.8 05/16/2023     05/16/2023    CO2 29 05/16/2023    BUN 13 05/16/2023    CREATININE 0.85 05/16/2023    EGFR 92 05/16/2023    CALCIUM 9.4 05/16/2023    AST 16 05/16/2023    ALT 11 05/16/2023    ALKPHOS 60 05/16/2023     No results found for: "MG"  Lab Results   Component Value Date    HDL 54 05/16/2023    TRIG 66 05/16/2023    LDLCALC 69 05/16/2023     No results found for: "WMW6FZNQWLTG", "T3FREE", "FREET4", "L4WNQKY", "E1SSASS"    Imaging:   I have personally reviewed pertinent films in PACS    Cardiac testing:     Name: Shawn Sherman                       : 1950  MRN: [id number]                       Age: 67 y.o. Patient Status: Outpatient          Gender: male  Echo complete    Height:  6' 1" (1.854 m)   Weight:  227 lb (103 kg)   BSA:  2.27 m²   Blood Pressure:  130/70    Date of Study:  22   Ordering Provider:  Rohit Riley MD   Clinical Indications:  Systolic murmur [N14.8 ([id number])]       Interpreting Physicians  Performing Staff    Jaclyn Atwood MD Tech:  Emili Harris,           Vitals    Height Weight BSA (Calculated - m2) BP Pulse   6' 1" (1.854 m) 103 kg (227 lb) 2.27 sq meters 130/70 90       PACS Images     Show images for Echo complete w/ contrast if indicated      Study Details    This transthoracic echocardiogram was performed in the echo lab. This was a routine, outpatient study. Study quality was adequate. A complete 2D, color flow Doppler, spectral Doppler, 2D, color flow Doppler and spectral Doppler transthoracic echocardiogram was performed. The apical, parasternal, subcostal and suprasternal views were obtained. History    DM, HTN, HLD, Murmur       Interpretation Summary       •  Left Ventricle: Left ventricular cavity size is normal. Wall thickness is increased. There is severe asymmetric hypertrophy of the septal wall. The left ventricular ejection fraction is 65%. Systolic function is vigorous. Global longitudinal strain is reduced at 15%. Wall motion is normal. Diastolic function is mildly abnormal, consistent with grade I (abnormal) relaxation. There is  outflow tract dynamic obstruction at rest with a peak gradient of 60 mmgHg. There is outflow tract dynamic obstruction with valsalva with a peak gradient of 77.0 mmHg. •  Left Atrium: The atrium is mildly dilated. •  Aortic Valve: There is aortic sclerosis. •  Mitral Valve: There is mild diffuse thickening.  The leaflets are elonaged. The leaflets exhibit normal mobility. There is mild to moderate regurgitation. There is systolic anterior motion of the anterior leaflet with late peaking gradient. •  Aorta: The ascending aorta is mildly dilated at 41mm.     Findings appear consistent with hypertrophic cardiomyopathy.           Strain was performed to quantify interventricular dyssynchrony and evaluate components of myocardial function due to possible HCM. Results from the utilization of Strain Analysis are listed in the report below.     Findings    Left Ventricle Left ventricular cavity size is normal. Wall thickness is increased. There is severe asymmetric hypertrophy of the septal wall. The left ventricular ejection fraction is 65%. Systolic function is vigorous. Global longitudinal strain is reduced at 15%. Wall motion is normal. Diastolic function is mildly abnormal, consistent with grade I (abnormal) relaxation. There is  outflow tract dynamic obstruction at rest with a peak gradient of 60 mmgHg. There is outflow tract dynamic obstruction with valsalva with a peak gradient of 77.0 mmHg. Right Ventricle Right ventricular cavity size is normal. Systolic function is normal.      Left Atrium The atrium is mildly dilated. Right Atrium The atrium is upper normal in size. Aortic Valve The aortic valve is trileaflet. The leaflets are mildly thickened. The leaflets are mildly calcified. The leaflets exhibit normal mobility. There is no evidence of regurgitation. There is aortic sclerosis. Mitral Valve There is mild diffuse thickening. The leaflets are elonaged. The leaflets exhibit normal mobility. There is mild to moderate regurgitation. There is no evidence of stenosis. There is systolic anterior motion of the anterior leaflet with late peaking gradient. Tricuspid Valve Tricuspid valve structure is normal. There is no evidence of regurgitation. There is no evidence of stenosis.  The right ventricular systolic pressure is normal. The estimated right ventricular systolic pressure is 34.88 mmHg. Pulmonic Valve Pulmonic valve structure is normal. There is trace regurgitation. There is no evidence of stenosis. Ascending Aorta The ascending aorta is mildly dilated at 41mm. IVC/SVC The right atrial pressure is estimated at 4.0 mmHg. The inferior vena cava is normal in size. Pericardium There is no pericardial effusion.         Left Ventricle Measurements    Function/Volumes   A4C EF 68 %         Dimensions   LVIDd 4.7 cm         LVIDS 3.3 cm         IVSd 2.2 cm         LVPWd 1.3 cm         FS 30          Strain   GLS 15 %          Other Measurements   Peak gradient (Rest) 60 mmHg         Peak Gradient (Valsalva) 77 mmHg              Right Ventricle Measurements    Dimensions   RVID d 3.2 cm               Left Atrium Measurements    Dimensions   LA size 5.1 cm               Right Atrium Measurements    Dimensions   RA 2D Volume 54 mL         RAA A4C 19.8 cm2               Aortic Valve Measurements    Other Measurements   Aortic Valve Area by Planimetry 2.8 cm2               Tricuspid Valve Measurements    RVSP Parameters   TR Peak Rodrigo 2.2 m/s         Est. RA pres 4 mmHg         Triscuspid Valve Regurgitation Peak Gradient 20 mmHg         Right Ventricular Peak Systolic Pressure 24 mmHg               Aorta Measurements    Aortic Dimensions   Ao root 3.2 cm               IVC/SVC Measurements    IVC/SVC   Est. RA pres 4 mmHg                Exam Details    Performed Procedure Technologist Supporting Staff Performing Physician   Echo complete Micheline Owusu           Appointment Date/Status Modality Department    11/22/2022     Completed EA ECHO 2 EA CAR NON INV           Begin Exam End Exam  End Exam Questionnaires   11/22/2022 11:07 AM 11/22/2022 12:32 PM  PATIENT EDUCATION          All Reviewers List    Zoran Bruner on 12/6/2022  2:38 PM   Nghia King MD on 11/22/2022  6:45 PM       Signed    Electronically signed by Cullen Matias MD on 11/22/22 at [id number] EST     Counseling / Coordination of Care  Total floor / unit time spent today 60 minutes. Greater than 50% of total time was spent with the patient and / or family counseling and / or coordination of care.

## 2023-08-08 ENCOUNTER — OFFICE VISIT (OUTPATIENT)
Dept: CARDIAC SURGERY | Facility: CLINIC | Age: 73
End: 2023-08-08
Payer: COMMERCIAL

## 2023-08-08 VITALS
OXYGEN SATURATION: 99 % | DIASTOLIC BLOOD PRESSURE: 89 MMHG | BODY MASS INDEX: 25.84 KG/M2 | WEIGHT: 195 LBS | HEART RATE: 69 BPM | SYSTOLIC BLOOD PRESSURE: 135 MMHG | HEIGHT: 73 IN

## 2023-08-08 DIAGNOSIS — I42.2 HYPERTROPHIC CARDIOMYOPATHY (HCC): ICD-10-CM

## 2023-08-08 DIAGNOSIS — I10 ESSENTIAL HYPERTENSION: Primary | ICD-10-CM

## 2023-08-08 PROCEDURE — 99215 OFFICE O/P EST HI 40 MIN: CPT | Performed by: INTERNAL MEDICINE

## 2023-08-10 ENCOUNTER — DOCUMENTATION (OUTPATIENT)
Dept: CARDIOLOGY CLINIC | Facility: CLINIC | Age: 73
End: 2023-08-10

## 2023-09-05 ENCOUNTER — DOCUMENTATION (OUTPATIENT)
Dept: CARDIOLOGY CLINIC | Facility: CLINIC | Age: 73
End: 2023-09-05

## 2023-09-11 ENCOUNTER — HOSPITAL ENCOUNTER (OUTPATIENT)
Dept: NON INVASIVE DIAGNOSTICS | Facility: HOSPITAL | Age: 73
Discharge: HOME | End: 2023-09-11

## 2023-09-21 ENCOUNTER — DOCUMENTATION (OUTPATIENT)
Dept: CARDIAC SURGERY | Facility: CLINIC | Age: 73
End: 2023-09-21

## 2023-09-21 ENCOUNTER — HOSPITAL ENCOUNTER (OUTPATIENT)
Dept: NON INVASIVE DIAGNOSTICS | Facility: HOSPITAL | Age: 73
Discharge: HOME | End: 2023-09-21
Payer: COMMERCIAL

## 2023-09-21 VITALS
HEIGHT: 73 IN | HEART RATE: 56 BPM | SYSTOLIC BLOOD PRESSURE: 122 MMHG | WEIGHT: 195 LBS | BODY MASS INDEX: 25.84 KG/M2 | DIASTOLIC BLOOD PRESSURE: 62 MMHG | OXYGEN SATURATION: 99 % | RESPIRATION RATE: 12 BRPM

## 2023-09-21 DIAGNOSIS — I42.2 HYPERTROPHIC CARDIOMYOPATHY (HCC): ICD-10-CM

## 2023-09-21 DIAGNOSIS — I10 ESSENTIAL HYPERTENSION: Primary | ICD-10-CM

## 2023-09-21 LAB
CHEST PAIN STATEMENT: NORMAL
E WAVE DECELERATION TIME: 321 MS
FRACTIONAL SHORTENING: 30 % (ref 28–44)
INTERVENTRICULAR SEPTUM IN DIASTOLE (PARASTERNAL SHORT AXIS VIEW): 1.7 CM
INTERVENTRICULAR SEPTUM: 1.7 CM (ref 0.6–1.1)
LEFT INTERNAL DIMENSION IN SYSTOLE: 1.9 CM (ref 2.1–4)
LEFT VENTRICULAR INTERNAL DIMENSION IN DIASTOLE: 2.7 CM (ref 3.5–6)
LEFT VENTRICULAR POSTERIOR WALL IN END DIASTOLE: 1.6 CM
LEFT VENTRICULAR STROKE VOLUME: 15 ML
LVSV (TEICH): 15 ML
MAX DIASTOLIC BP: 80 MMHG
MAX HEART RATE: 97 BPM
MAX HR PERCENT: 66 %
MAX HR: 97 BPM
MAX PREDICTED HEART RATE: 146 BPM
MAX. SYSTOLIC BP: 150 MMHG
MV E'TISSUE VEL-SEP: 4 CM/S
MV PEAK A VEL: 0.8 M/S
MV PEAK E VEL: 71 CM/S
MV STENOSIS PRESSURE HALF TIME: 93 MS
MV VALVE AREA P 1/2 METHOD: 2.37 CM2
PROTOCOL NAME: NORMAL
RATE PRESSURE PRODUCT: NORMAL
REASON FOR TERMINATION: NORMAL
SL CV ECHO LV DYNAMIC OBSTRUCTION PEAK GRADIENT (REST): 27 MMHG
SL CV ECHO LV DYNAMIC OBSTRUCTION PEAK GRADIENT (VALSAL: 158 MMHG
SL CV LV EF: 72
SL CV PED ECHO LEFT VENTRICLE DIASTOLIC VOLUME (MOD BIPLANE) 2D: 27 ML
SL CV PED ECHO LEFT VENTRICLE SYSTOLIC VOLUME (MOD BIPLANE) 2D: 12 ML
SL CV STRESS RECOVERY BP: NORMAL MMHG
SL CV STRESS RECOVERY HR: 65 BPM
SL CV STRESS RECOVERY O2 SAT: 98 %
STRESS ANGINA INDEX: 0
STRESS BASELINE BP: NORMAL MMHG
STRESS BASELINE HR: 55 BPM
STRESS O2 SAT REST: 99 %
STRESS PEAK HR: 95 BPM
STRESS POST ESTIMATED WORKLOAD: 3.9 METS
STRESS POST EXERCISE DUR MIN: 4 MIN
STRESS POST EXERCISE DUR SEC: 50 SEC
STRESS POST O2 SAT PEAK: 96 %
STRESS POST PEAK BP: 150 MMHG
TARGET HR FORMULA: NORMAL
TEST INDICATION: NORMAL
TIME IN EXERCISE PHASE: NORMAL

## 2023-09-21 PROCEDURE — 93350 STRESS TTE ONLY: CPT

## 2023-09-21 PROCEDURE — 93350 STRESS TTE ONLY: CPT | Performed by: INTERNAL MEDICINE

## 2023-09-21 RX ORDER — NEBULIZER AND COMPRESSOR
EACH MISCELLANEOUS 2 TIMES DAILY
Qty: 1 KIT | Refills: 0 | Status: SHIPPED | OUTPATIENT
Start: 2023-09-21

## 2023-09-21 RX ORDER — DILTIAZEM HYDROCHLORIDE 240 MG/1
240 CAPSULE, COATED, EXTENDED RELEASE ORAL
Qty: 30 CAPSULE | Refills: 1 | Status: SHIPPED | OUTPATIENT
Start: 2023-09-21

## 2023-09-22 ENCOUNTER — TELEPHONE (OUTPATIENT)
Dept: CARDIOLOGY CLINIC | Facility: CLINIC | Age: 73
End: 2023-09-22

## 2023-09-22 NOTE — TELEPHONE ENCOUNTER
Hi, this is [address] in [facility] calling back. We called yesterday and left a message calling back on a new prescription    New prescription written from doctor [facility] [place], new script for Diltiazem  milligram capsules and we're getting a contraindication with the patients simvastatin 80 milligram tablets. The issue is that the tie ISM can increase the simvastatin levels which can cause increased chances of side effects, especially the muscle issues. So again, if someone could check on this and get back to us, we'd appreciate it. Phone number here at [address] is [phone]. You received a voice mail from [phone].

## 2023-09-22 NOTE — TELEPHONE ENCOUNTER
Hi, this is [employer] over in [facility]. I'm calling about a new prescription we just received from [address] [place] [address] for patient Adam Venegas. Birth date 1950. New script is for the D  milligram capsules one daily at bedtime. We're calling because I'm calling because we're getting a contraindicated drug combination with the patients simvastatin 80 milligram. The effects it says concurrent use of diltiazem and the simvastatin can result in elevated levels of this simvastatin, which can result in all kinds of side effects from the simvastatin, including all the muscle aches and pains. So we need to have this checked on by the doctor before we can fill anything. If someone would check on this and get back to us, we'd appreciate it. The phone number here with any questions is [phone]. And again, [employer] in [facility]. Thank you. You received a voice mail from [phone].

## 2023-10-02 PROBLEM — Z00.00 MEDICARE ANNUAL WELLNESS VISIT, SUBSEQUENT: Status: RESOLVED | Noted: 2021-01-29 | Resolved: 2023-10-02

## 2023-11-13 ENCOUNTER — TELEPHONE (OUTPATIENT)
Dept: CARDIAC SURGERY | Facility: CLINIC | Age: 73
End: 2023-11-13

## 2023-11-13 NOTE — TELEPHONE ENCOUNTER
Patient placed on diltiazem 240 mg daily at bedtime on 9/21/2023 after stress echo in the setting of increased LVOT gradients by Dr. Zeke Acosta patient returns blood pressure readings to us as he was asked to monitor his blood pressure. On this new medication and systolic blood pressure is averaging anywhere from mid 110's to mid 120s and heart rate is occasionally in the high 40s to low 50s. I contacted the patient and left him a voicemail asking him to continue current medications and I will review these results in a few days with Dr Zeke Acosta when he returns from conference. I asked patient to return a call to our office if he is having any concerns.

## 2023-11-20 DIAGNOSIS — I10 ESSENTIAL HYPERTENSION: ICD-10-CM

## 2023-11-20 DIAGNOSIS — I42.2 HYPERTROPHIC CARDIOMYOPATHY (HCC): ICD-10-CM

## 2023-11-20 RX ORDER — DILTIAZEM HYDROCHLORIDE 240 MG/1
240 CAPSULE, COATED, EXTENDED RELEASE ORAL
Qty: 30 CAPSULE | Refills: 1 | Status: SHIPPED | OUTPATIENT
Start: 2023-11-20

## 2023-11-29 ENCOUNTER — VBI (OUTPATIENT)
Dept: ADMINISTRATIVE | Facility: OTHER | Age: 73
End: 2023-11-29

## 2023-12-18 DIAGNOSIS — E11.9 DIABETES MELLITUS WITHOUT COMPLICATION (HCC): ICD-10-CM

## 2023-12-18 NOTE — TELEPHONE ENCOUNTER
Reason for call:   [x] Refill   [] Prior Auth  [] Other:     Office:   [x] PCP/Provider - Dr Smith  [] Specialty/Provider -     Medication: metformin 500mg 2tabs bid# 120      Pharmacy: [facility] [address]    Does the patient have enough for 3 days?   [x] Yes   [] No - Send as HP to POD

## 2024-01-19 DIAGNOSIS — E78.5 HYPERLIPIDEMIA, UNSPECIFIED HYPERLIPIDEMIA TYPE: ICD-10-CM

## 2024-01-19 DIAGNOSIS — I10 ESSENTIAL HYPERTENSION: ICD-10-CM

## 2024-01-19 RX ORDER — DILTIAZEM HYDROCHLORIDE 240 MG/1
240 CAPSULE, COATED, EXTENDED RELEASE ORAL
Qty: 90 CAPSULE | Refills: 3 | Status: SHIPPED | OUTPATIENT
Start: 2024-01-19

## 2024-01-19 RX ORDER — SIMVASTATIN 80 MG
80 TABLET ORAL
Qty: 30 TABLET | Refills: 5 | Status: SHIPPED | OUTPATIENT
Start: 2024-01-19

## 2024-01-19 NOTE — TELEPHONE ENCOUNTER
Reason for call:   [x] Refill   [] Prior Auth  [] Other:     Office:   [x] PCP/Provider -   [] Specialty/Provider -     Medication: simvastatin (ZOCOR) 80 mg tablet     Quantity: #30    Pharmacy: [id number] - [place] - [address] [facility] [phone]    Does the patient have enough for 3 days?   [] Yes   [x] No - Send as HP to POD

## 2024-02-01 ENCOUNTER — OFFICE VISIT (OUTPATIENT)
Dept: FAMILY MEDICINE CLINIC | Facility: CLINIC | Age: 74
End: 2024-02-01
Payer: COMMERCIAL

## 2024-02-01 VITALS
WEIGHT: 198 LBS | BODY MASS INDEX: 26.24 KG/M2 | SYSTOLIC BLOOD PRESSURE: 134 MMHG | DIASTOLIC BLOOD PRESSURE: 60 MMHG | RESPIRATION RATE: 16 BRPM | OXYGEN SATURATION: 98 % | HEART RATE: 80 BPM | HEIGHT: 73 IN

## 2024-02-01 DIAGNOSIS — E11.9 DIABETES MELLITUS WITHOUT COMPLICATION (HCC): ICD-10-CM

## 2024-02-01 DIAGNOSIS — I10 ESSENTIAL HYPERTENSION: Primary | ICD-10-CM

## 2024-02-01 DIAGNOSIS — E78.00 HYPERCHOLESTEROLEMIA: ICD-10-CM

## 2024-02-01 DIAGNOSIS — I42.2 HYPERTROPHIC CARDIOMYOPATHY (HCC): ICD-10-CM

## 2024-02-01 LAB — SL AMB POCT HEMOGLOBIN AIC: 5.7 (ref ?–6.5)

## 2024-02-01 PROCEDURE — 83036 HEMOGLOBIN GLYCOSYLATED A1C: CPT | Performed by: FAMILY MEDICINE

## 2024-02-01 PROCEDURE — 99214 OFFICE O/P EST MOD 30 MIN: CPT | Performed by: FAMILY MEDICINE

## 2024-02-01 NOTE — ASSESSMENT & PLAN NOTE
Recheck lipids and LFTs. Continue simvastatin 80 mg qhs. Advised pt to follow a low cholesterol diet and to exercise on a regular basis.

## 2024-02-01 NOTE — PROGRESS NOTES
Depression Screening and Follow-up Plan: Patient was screened for depression during today's encounter. They screened negative with a PHQ-2 score of 2.    Assessment/Plan:         Problem List Items Addressed This Visit        Endocrine    Diabetes mellitus without complication (HCC)     A1C done today and was 5.7. Continue metformin 500 mg with dinner. Advised pt to continue a low carb diet and to exercise on a regular basis. Patient told to schedule eye exam. Foot exam done today.    Lab Results   Component Value Date    HGBA1C 5.2 05/16/2023          Relevant Orders    Albumin / creatinine urine ratio    POCT hemoglobin A1c (Completed)    Lipid Panel with Direct LDL reflex    Comprehensive metabolic panel       Cardiovascular and Mediastinum    Hypertrophic cardiomyopathy (HCC)     Patient saw Cardiology in April 2023 and had MRI heart in June 2023. Continue metoprolol 25 mg twice a day. Next appointment with Cardiology is in February 2024.         Essential hypertension - Primary     Blood pressure remains good. Continue amlodipine 10 mg qd, benazepril 40 mg qd, and metoprolol 25 mg twice a day. Pt advised to continue low Na diet and to exercise on a regular basis.         Relevant Orders    Lipid Panel with Direct LDL reflex    Comprehensive metabolic panel       Other    Hypercholesterolemia     Recheck lipids and LFTs. Continue simvastatin 80 mg qhs. Advised pt to follow a low cholesterol diet and to exercise on a regular basis.          Relevant Orders    Lipid Panel with Direct LDL reflex    Comprehensive metabolic panel         Subjective:      Patient ID: Lazaro Mena is a 73 y.o. male.    Patient here for follow-up Hypertension, Hyperlipidemia, Hypertrophic CMP, DM. Patient doing well. No chest pain or shortness of breath. No headaches. Follows low carb diet and exercises. Needs to schedule eye exam. Blood pressure has been good at home. Had stress echocardiogram in September 2023 and sees  Cardiology for follow-up in February 2024. No new complaints.         The following portions of the patient's history were reviewed and updated as appropriate:   Past Medical History:  He has a past medical history of Diabetes mellitus (HCC), Hypertension, Low back pain, and Sleep disorder.,  _______________________________________________________________________  Medical Problems:  does not have any pertinent problems on file.,  _______________________________________________________________________  Past Surgical History:   has a past surgical history that includes Tonsillectomy.,  _______________________________________________________________________  Family History:  family history includes No Known Problems in his father and mother.,  _______________________________________________________________________  Social History:   reports that he has never smoked. He has never used smokeless tobacco. He reports that he does not currently use alcohol. He reports that he does not use drugs.,  _______________________________________________________________________  Allergies:  has No Known Allergies..  _______________________________________________________________________  Current Outpatient Medications   Medication Sig Dispense Refill   • diltiazem (CARDIZEM CD) 240 mg 24 hr capsule Take 1 capsule (240 mg total) by mouth daily at bedtime 90 capsule 3   • metFORMIN (GLUCOPHAGE) 500 mg tablet Take 2 tablets (1,000 mg total) by mouth 2 (two) times a day with meals 120 tablet 5   • metoprolol tartrate (LOPRESSOR) 25 mg tablet Take 1 tablet (25 mg total) by mouth every 12 (twelve) hours 180 tablet 3   • simvastatin (ZOCOR) 80 mg tablet Take 1 tablet (80 mg total) by mouth daily at bedtime 30 tablet 5     No current facility-administered medications for this visit.     _______________________________________________________________________  Review of Systems   Constitutional:  Negative for fatigue and unexpected weight  "change.   Respiratory:  Negative for cough and shortness of breath.    Cardiovascular:  Negative for chest pain.   Gastrointestinal:  Negative for abdominal pain, constipation, diarrhea and vomiting.   Musculoskeletal:  Negative for arthralgias.   Neurological:  Negative for dizziness and headaches.   Psychiatric/Behavioral:  Negative for dysphoric mood. The patient is not nervous/anxious.          Objective:  Vitals:    02/01/24 1012 02/01/24 1041   BP: 140/72 134/60   BP Location: Left arm Left arm   Patient Position: Sitting Sitting   Cuff Size: Large Large   Pulse: 80    Resp: 16    SpO2: 98%    Weight: 89.8 kg (198 lb)    Height: 6' 1\" (1.854 m)      Body mass index is 26.12 kg/m².     Physical Exam  Vitals and nursing note reviewed.   Constitutional:       Appearance: Normal appearance. He is well-developed and normal weight.   Neck:      Thyroid: No thyromegaly.   Cardiovascular:      Rate and Rhythm: Normal rate and regular rhythm.      Pulses: no weak pulses     Heart sounds: Murmur heard.   Pulmonary:      Effort: Pulmonary effort is normal. No respiratory distress.      Breath sounds: Normal breath sounds. No wheezing.   Musculoskeletal:      Cervical back: Normal range of motion and neck supple.      Right lower leg: No edema.      Left lower leg: No edema.   Feet:      Right foot:      Skin integrity: Dry skin present. No ulcer, skin breakdown, erythema, warmth or callus.      Left foot:      Skin integrity: Dry skin present. No ulcer, skin breakdown, erythema, warmth or callus.   Lymphadenopathy:      Cervical: No cervical adenopathy.   Neurological:      Mental Status: He is alert and oriented to person, place, and time.      Cranial Nerves: No cranial nerve deficit.   Psychiatric:         Mood and Affect: Mood normal.         Behavior: Behavior normal.         Thought Content: Thought content normal.         Judgment: Judgment normal.       Patient's shoes and socks removed.    Right Foot/Ankle "   Right Foot Inspection  Skin Exam: skin normal, skin intact and dry skin. No warmth, no callus, no erythema, no maceration, no abnormal color, no pre-ulcer, no ulcer and no callus.     Sensory   Proprioception: intact      Vascular  Capillary refills: < 3 seconds      Left Foot/Ankle  Left Foot Inspection  Skin Exam: skin normal, skin intact and dry skin. No warmth, no erythema, no maceration, normal color, no pre-ulcer, no ulcer and no callus.     Sensory   Proprioception: intact      Vascular  Capillary refills: < 3 seconds      Assign Risk Category  No deformity present  No loss of protective sensation  No weak pulses  Risk: 0

## 2024-02-01 NOTE — ASSESSMENT & PLAN NOTE
Patient saw Cardiology in April 2023 and had MRI heart in June 2023. Continue metoprolol 25 mg twice a day. Next appointment with Cardiology is in February 2024.

## 2024-02-01 NOTE — ASSESSMENT & PLAN NOTE
Blood pressure remains good. Continue amlodipine 10 mg qd, benazepril 40 mg qd, and metoprolol 25 mg twice a day. Pt advised to continue low Na diet and to exercise on a regular basis.

## 2024-02-01 NOTE — ASSESSMENT & PLAN NOTE
A1C done today and was 5.7. Continue metformin 500 mg with dinner. Advised pt to continue a low carb diet and to exercise on a regular basis. Patient told to schedule eye exam. Foot exam done today.    Lab Results   Component Value Date    HGBA1C 5.2 05/16/2023

## 2024-02-19 NOTE — PROGRESS NOTES
HCM Clinic Follow-up Visit- Cardiology   Lazaro Mena 73 y.o. male MRN: [id number]  Unit/Bed#:  Encounter: [id number]    Patient Active Problem List    Diagnosis Date Noted    Hypertrophic cardiomyopathy (HCC) 12/15/2022    Essential hypertension 01/23/2021    Diabetes mellitus without complication (HCC) 01/23/2021    Hypercholesterolemia 01/23/2021    MARYAM (generalized anxiety disorder) 01/23/2021    RICHARD (obstructive sleep apnea) 01/23/2021       Plan: Since his lats office visit on 8-8-2023 he underwent an exercise stress echo on 9- with the following results: Left Ventricle: Wall thickness is severely increased. There is severe asymmetric hypertrophy of the septal wall. The left ventricular ejection fraction is 72%. Systolic function is hyperdynamic. Wall motion is normal. Diastolic function is mildly abnormal, consistent with grade I (abnormal) relaxation. There is outflow tract dynamic obstruction at rest with a peak gradient of 27.0 mmHg. There is outflow tract dynamic obstruction with valsalva with a peak gradient of 158.0 mmHg. Left Atrium: The atrium is mildly dilated. Aortic Valve: There is aortic valve sclerosis. Mitral Valve: There is moderate annular calcification. There is mild regurgitation. There is systolic anterior motion of the posterior leaflet and anterior leaflet with late peaking gradient. stress ECG: No ST deviation is noted. There were no arrhythmias during recovery. . The ECG was not diagnostic due to submaximal stress. The stress ECG is negative for ischemia after submaximal exercise, without reproduction of symptoms. Post Stress Echo: Left ventricle cavity is small post-stress. The left ventricle systolic function is hyperdynamic post-stress. Marked increase in LVOT gradient was noted during exercise. Patient was limited in exercise capacity partly related to left knee pain. Echo Post Impression: The study is equivocal for myocardial ischemia, after submaximal exercise.   Given  "evidence of dynamic obstruction, the patient's amlodipine and benazapril were discontinued and he was started on diltiazem 240 mg QHS and his metoprolol tartrate 25 mg BID was continued.     Today, the patient offers no overt cardiac complaints.  Overall it does sound like he is somewhat sedentary throughout the day but he does build optical instruments and states that certain days he will have to climb the stairs to his second floor and back down and sometimes down to the basement and back up several times a day.  He states he can perform his activities of daily living with no complaints of chest discomfort, dyspnea exertion or palpitations.  He has no lower extremity edema orthopnea.  He gets very rare episodes of lightheadedness mainly \" when I moved to quick\".  I did review with the patient that he does have evidence of a left ventricular outflow tract gradient on his stress echo and I did discuss the role of mavacamten in treatment of patients with obstructive HCM.  The patient was receptive to my discussion but he states that there are no cardiac symptoms limiting his daily activities and he would prefer to continue to monitor how he feels and contact us if there are any declines. His blood pressure is mildly elevated today at 146/62 but he brings in a very detailed diary of blood pressures since this past November and his systolic blood pressure has been running consistently 110's-120's at home.  He states he will continue to monitor his blood pressure but he was nervous about this office visit today and likely this is the reasoning for his elevated blood pressure.  He does try to follow a low-sodium diet and I have reminded him to also remain well-hydrated.  His average heart rate on his blood pressure diary is in the high 40s to low 50s but the patient denies any dizziness/lightheadedness on a regular basis.  LDL in May 2023 was 69 and he continues on simvastatin 80 mg QD. HgbA1c was recently 5.7 and he " continues on metformin under direction of his PCP.  Overall the patient feels he is stable and he will follow-up in our office in 6 months with an updated echocardiogram.  I did ask the patient to continue thinking about the use of mavacamten in his current condition and asked him to contact our office if he should feel a functional decline in his exercise capacity.    Physician Requesting Consult: Juan Jose Cohn MD  Reason for Consult / Principal Problem: HCM    History of Present Illness     HPI: Lazaro Mena is a 73 y.o. year old male with past cardiovascular history of hypertension and hypercholesterolemia who was seen by Dr Cohn for evaluation of newly diagnosed HCM phenotype on echocardiography. He does also have history of diabetes and obstructive sleep apnea. The index echocardiogram was performed on 11- and showed:       Left Ventricle: Left ventricular cavity size is normal. Wall thickness is increased. There is severe asymmetric hypertrophy of the septal wall. The left ventricular ejection fraction is 65%. Systolic function is vigorous. Global longitudinal strain is reduced at 15%. Wall motion is normal. Diastolic function is mildly abnormal, consistent with grade I (abnormal) relaxation. There is  outflow tract dynamic obstruction at rest with a peak gradient of 60 mmgHg. There is outflow tract dynamic obstruction with valsalva with a peak gradient of 77.0 mmHg.    Left Atrium: The atrium is mildly dilated.    Aortic Valve: There is aortic sclerosis.    Mitral Valve: There is mild diffuse thickening. The leaflets are elonaged. The leaflets exhibit normal mobility. There is mild to moderate regurgitation. There is systolic anterior motion of the anterior leaflet with late peaking gradient.    Aorta: The ascending aorta is mildly dilated at 41mm.          Review of the study shows a maximum wall thickness of 26 mm in basal septum, bi-leaflet DAYA and mild mitral regurgitation.      CMR performed  on 6-1-2023 showed:     Impression:  1. 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 Basal septum measures up to 21 mm.  2. Normal right ventricular size and systolic function.  3. Moderately dilated left atrium. Mitral regurgitation visualized.  4. Interstitial and replacement fibrosis in the hypertrophied segments involving approximately 10% of the total myocardium          A 7-day extended monitor (5-5 to 5-) showed:     Patient had a min HR of 38 bpm, max HR of 187 bpm, and avg HR of 66 bpm. Predominant underlying rhythm was Sinus Rhythm. First Degree AV Block was present. 3 Ventricular Tachycardia runs occurred, the run with the fastest interval lasting 4 beats with a max rate of 187 bpm, the longest lasting 5 beats with an avg rate of 116 bpm. 5 Supraventricular Tachycardia runs occurred, the run with the  fastest interval lasting 13 beats with a max rate of 119 bpm (avg 105 bpm); the run with the fastest interval was also the longest. Isolated SVEs were rare (<1.0%), SVE Couplets were rare (<1.0%), and SVE Triplets were rare (<1.0%). Isolated VEs were rare (<1.0%, 1659), VE Couplets were rare (<1.0%, 32), and VE Triplets were rare (<1.0%, 4). Ventricular Bigeminy was present.    8-8-2023: Mr Lazaro Mena has obstructive hypertrophic cardiomyopathy. Today he notes he overall feels well. He is very active at home and has no cardiac complaints.  With regards to his overall health, he has lost weight over the past year having lost almost 15 lbs since February alone and he has done this by significantly reducing carbohydrates and total calories in his diet. His HgbA1c has reduced to 5.2 from 6.5 in July 2022 and he notes his PCP has discussed taking him off of his metformin given his weight loss which has helped with his HgbA1c reduction. His blood pressure today is 135/89 and he states this weight loss has helped his readings. He remains on  "amlodipine 10 mg daily, benazapril 40 mg daily and metoprolol tartrate 25 mg twice daily which we will continue for now. He follows a low sodium diet. Recent Lipid panel shows triglycerides 66, HDL 54 and LDL 69 and he takes simvastatin 80 mg daily. He has presumed RICHARD given history of snoring but he has had no formal sleep study. Since he has lost weight he feels his snoring has reduced significantly. He also feels his weight loss have given him more energy. 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 To complete his risk stratification for HCM we will order exercise bike stress echo and we will also order genetic testing. We spoke in detail about HCM, exercise recommendations, life style modification, hydration and possible need to switch him from vasodilators to other antihypertensive medications after completion of risk stratification. At this time, he has no indication for primary prevention ICD. Educational material and contact information were provided to the patient.        Risk stratification:  Nonsustained ventricular tachycardia-no  Severe left ventricular hypertrophy-no  Family history of sudden death - no  Unexplained syncope-no  LVOT obstruction-yes  Atrial fibrillation and left atrial dilation-yes  Age- 72 years old  NYHA- class I-II  Myocardial fibrosis-Interstitial and replacement fibrosis in the hypertrophied segments involving approximately 10% of the total myocardium  LV systolic dysfunction-no  Apical aneurysm-no     Review of systems: Denies chest discomfort, dyspnea exertion and palpitations; denies lower extremity edema and orthopnea; notes occasional lightheadedness/dizziness with quick position change but denies any near-syncope or aline syncope     Family history: Father passed away at age 82 from cancer (patient is unsure what kind) did have a \"heart " "condition\" but patient does not know the actual diagnosis; Mother passed away at age 87 years old but patient is not sure the cause; he has a sister who is 85 years old and two brothers who are 79 and 67 years old but he is not sure of their status as they are estranged; he has a daughter who is 39 years old and son is 38 years old both with no medical issues; his daughter has tested positive for MYBPC3 but he does not feel that his son participated in genetic testing but he is unsure; he has two granddaughters; he has no known SCD in his family     Genetic testing:        Devices: none      Historical Information   Past Medical History:   Diagnosis Date    Diabetes mellitus (HCC)     Hypertension     Low back pain     Sleep disorder      Past Surgical History:   Procedure Laterality Date    TONSILLECTOMY       Family History   Problem Relation Age of Onset    No Known Problems Mother     No Known Problems Father      Current Outpatient Medications on File Prior to Visit   Medication Sig Dispense Refill    diltiazem (CARDIZEM CD) 240 mg 24 hr capsule Take 1 capsule (240 mg total) by mouth daily at bedtime 90 capsule 3    metFORMIN (GLUCOPHAGE) 500 mg tablet Take 2 tablets (1,000 mg total) by mouth 2 (two) times a day with meals (Patient taking differently: Take 500 mg by mouth every evening) 120 tablet 5    metoprolol tartrate (LOPRESSOR) 25 mg tablet Take 1 tablet (25 mg total) by mouth every 12 (twelve) hours 180 tablet 3    simvastatin (ZOCOR) 80 mg tablet Take 1 tablet (80 mg total) by mouth daily at bedtime 30 tablet 5     No current facility-administered medications on file prior to visit.     No Known Allergies  Social History     Substance and Sexual Activity   Alcohol Use Not Currently     Social History     Substance and Sexual Activity   Drug Use Never     Social History     Tobacco Use   Smoking Status Never   Smokeless Tobacco Never         Objective   Vitals: Blood pressure 146/62, pulse (!) 54, weight " "96.2 kg (212 lb), SpO2 98%., Body mass index is 27.97 kg/m².,   Vitals:    02/26/24 1050   BP: 146/62   BP Location: Left arm   Patient Position: Sitting   Cuff Size: Standard   Pulse: (!) 54   SpO2: 98%   Weight: 96.2 kg (212 lb)      Body mass index is 27.97 kg/m².  Body surface area is 2.21 meters squared.      Invasive Devices       Peripheral Intravenous Line  Duration             Peripheral IV Left;Ventral (anterior) Wrist -- days                    Physical Exam:  GEN: Lazaro Mena appears well, alert and oriented x 3, pleasant and cooperative   HEENT: pupils equal, round, and reactive to light; extraocular muscles intact  NECK: supple, no carotid bruits   HEART: regular rhythm, normal S1 and S2, 3/6 murmurs noted, no clicks, gallops or rubs   LUNGS: clear to auscultation bilaterally; no wheezes, rales, or rhonchi   ABDOMEN: normal bowel sounds, soft, no tenderness, no distention  EXTREMITIES: peripheral pulses normal; no clubbing, cyanosis, or edema  NEURO: no focal findings   SKIN: normal without suspicious lesions on exposed skin    Lab Results:   Lab Results   Component Value Date    WBC 6.6 05/16/2023    RBC 4.81 05/16/2023    HGB 14.6 05/16/2023    HCT 42.1 05/16/2023    MCV 87.5 05/16/2023     05/16/2023    RDW 12.3 05/16/2023     Lab Results   Component Value Date    K 3.8 05/16/2023     05/16/2023    CO2 29 05/16/2023    BUN 13 05/16/2023    CREATININE 0.85 05/16/2023    EGFR 92 05/16/2023    CALCIUM 9.4 05/16/2023    AST 16 05/16/2023    ALT 11 05/16/2023    ALKPHOS 60 05/16/2023     No results found for: \"MG\"  Lab Results   Component Value Date    HDL 54 05/16/2023    TRIG 66 05/16/2023    LDLCALC 69 05/16/2023     No results found for: \"LTA2YIBSHAZB\", \"T3FREE\", \"FREET4\", \"P2WGIKJ\", \"G3MUDMK\"    Imaging:   I have personally reviewed pertinent films in PACS  No results found.      Cardiac testing:     Exercise stress echo 9-    Interpretation Summary         Left Ventricle: " Wall thickness is severely increased. There is severe asymmetric hypertrophy of the septal wall. The left ventricular ejection fraction is 72%. Systolic function is hyperdynamic. Wall motion is normal. Diastolic function is mildly abnormal, consistent with grade I (abnormal) relaxation. There is  outflow tract dynamic obstruction at rest with a peak gradient of 27.0 mmHg. There is outflow tract dynamic obstruction with valsalva with a peak gradient of 158.0 mmHg.    Left Atrium: The atrium is mildly dilated.    Aortic Valve: There is aortic valve sclerosis.    Mitral Valve: There is moderate annular calcification. There is mild regurgitation. There is systolic anterior motion of the posterior leaflet and anterior leaflet with late peaking gradient.    Stress ECG: No ST deviation is noted. There were no arrhythmias during recovery. . The ECG was not diagnostic due to submaximal stress. The stress ECG is negative for ischemia after submaximal exercise, without reproduction of symptoms.    Post Stress Echo: Left ventricle cavity is small post-stress. The left ventricle systolic function is hyperdynamic post-stress. Marked increase in LVOT gradient was noted during exercise. Patient was limited in exercise capacity partly related to left knee pain.    Echo Post Impression: The study is equivocal for myocardial ischemia, after submaximal exercise.     Strain was performed to quantify interventricular dyssynchrony and evaluate components of myocardial function due to HCM . Results from the utilization of Strain Analysis are listed in the report below.     Stress Findings    Stress Findings A bicycle protocol stress test was performed. Overall, the patient's exercise capacity was moderately impaired for their age. The patient after exercising for 4 min and 50 sec and had a maximal HR of 97 bpm (66 % of MPHR) and 3.9 METS. The patient experienced no angina during the test. The test was stopped because the patient  experienced dyspnea. The patient requested the test to be stopped. The test was stopped because of left leg pain. The patient reported dyspnea and left leg pain during the stress test. Symptoms began during stress and ended during recovery. Blood pressure demonstrated a normal response and heart rate demonstrated a normal response to stress.     Findings    Left Ventricle Left ventricular cavity size is small. Wall thickness is severely increased. There is severe asymmetric hypertrophy of the septal wall. The left ventricular ejection fraction is 72%. Systolic function is hyperdynamic.  Wall motion is normal. Diastolic function is mildly abnormal, consistent with grade I (abnormal) relaxation.  There is  outflow tract dynamic obstruction at rest with a peak gradient of 27.0 mmHg. There is outflow tract dynamic obstruction with valsalva with a peak gradient of 158.0 mmHg.   Right Ventricle Right ventricular cavity size is normal. Systolic function is normal. Wall thickness is normal.   Left Atrium The atrium is mildly dilated.   Right Atrium The atrium is normal in size.   Aortic Valve The aortic valve is trileaflet. The leaflets are mildly thickened. The leaflets are mildly calcified. The leaflets exhibit normal mobility. There is no evidence of regurgitation. There is aortic valve sclerosis.   Mitral Valve The leaflets are not thickened. The leaflets are not calcified. The leaflets exhibit normal mobility. There is moderate annular calcification. There is mild regurgitation. There is no evidence of stenosis. There is systolic anterior motion of the posterior leaflet and anterior leaflet with late peaking gradient.   Tricuspid Valve Tricuspid valve structure is normal. There is no evidence of regurgitation. There is no evidence of stenosis.   Ascending Aorta The aortic root is normal in size.   Pericardium There is no pericardial effusion. The pericardium is normal in appearance.     Stress Echo Findings    Study  Impression The study is equivocal for myocardial ischemia, after submaximal exercise.     Stress Measurements    Baseline Vitals   Baseline HR 55 bpm         Baseline /62 mmHg         O2 sat rest 99 %         Peak Stress Vitals   Stress peak HR 95 bpm         Post peak  mmHg         O2 sat peak 96 %         Max HR Percent 66 %         Max HR 97 bpm         Recovery Vitals   Recovery HR 65 bpm         Recovery /80 mmHg         O2 sat recovery 98 %          Exercise Data   Max HR 97 bpm         Exercise duration (min) 4 min         Exercise duration (sec) 50 sec         Estimated workload 3.9 METS         Angina Index 0         Rate Pressure Product 14,250              Stage Data 9/21/23 11:04 AM--9/21/23 12:25 PM    Date/Time BP HR O2 RPP Symptoms   09/21/23 1104 122/62 55 bpm 99 % 6710 none   09/21/23 1139 140/80 88 bpm 99 % 19320 left knee pain   09/21/23 1140 150/80 96 bpm 99 % 83348 left knee pain/ mild sob   09/21/23 1141 -- 96 bpm 99 % -- left knee pain/ mod sob   09/21/23 1143 150/60 95 bpm 96 % 35015 left knee pain/ mod sob   09/21/23 1146 160/80 65 bpm 98 % 05102 symptoms resolving   09/21/23 1153 122/80 65 bpm 98 % 7930 none     Left Ventricle Measurements    Dimensions   LVIDd 2.7 cm         LVIDS 1.9 cm         IVSd 1.7 cm         LVPWd 1.6 cm         FS 30 %         Diastolic Filling   MV E' Tissue Velocity Septal 4 cm/s         E wave deceleration time 321 ms         MV Peak E Rodrigo 71 cm/s         MV Peak A Rodrigo 0.8 m/s          Other Measurements   Peak gradient (Rest) 27 mmHg         Peak Gradient (Valsalva) 158 mmHg              Mitral Valve Measurements    Stenosis   MV stenosis pressure 1/2 time 93 ms         MV valve area p 1/2 method 2.37 cm2               Exam Details    Performed Procedure Technologist Supporting Staff Performing Physician   Echo stress test, exercise DAYA Prather, RS Cb Frank MD         Appointment Date/Status Modality Department     9/21/2023     Completed BE STRESS 1 BE CAR NON INV           Begin Exam End Exam Begin Exam Questionnaires End Exam Questionnaires   9/21/2023 11:04 AM 9/21/2023 12:25 PM CV STRESS QUESTIONNAIRE PATIENT EDUCATION            All Reviewers List    JESU Irving on 9/21/2023  3:11 PM     Signed    Electronically signed by Cb Frank MD on 9/21/23 at 1501 EDT         Counseling / Coordination of Care  Total floor / unit time spent today 45 minutes.  Greater than 50% of total time was spent with the patient and / or family counseling and / or coordination of care.  A description of the counseling / coordination of care:   .

## 2024-02-26 ENCOUNTER — OFFICE VISIT (OUTPATIENT)
Dept: CARDIOLOGY CLINIC | Facility: CLINIC | Age: 74
End: 2024-02-26
Payer: COMMERCIAL

## 2024-02-26 VITALS
OXYGEN SATURATION: 98 % | BODY MASS INDEX: 27.97 KG/M2 | SYSTOLIC BLOOD PRESSURE: 146 MMHG | HEART RATE: 54 BPM | WEIGHT: 212 LBS | DIASTOLIC BLOOD PRESSURE: 62 MMHG

## 2024-02-26 DIAGNOSIS — I42.2 HYPERTROPHIC CARDIOMYOPATHY (HCC): Primary | ICD-10-CM

## 2024-02-26 PROCEDURE — 99214 OFFICE O/P EST MOD 30 MIN: CPT | Performed by: NURSE PRACTITIONER

## 2024-06-27 DIAGNOSIS — E78.5 HYPERLIPIDEMIA, UNSPECIFIED HYPERLIPIDEMIA TYPE: ICD-10-CM

## 2024-06-27 RX ORDER — SIMVASTATIN 80 MG
80 TABLET ORAL
Qty: 30 TABLET | Refills: 0 | Status: SHIPPED | OUTPATIENT
Start: 2024-06-27

## 2024-06-27 NOTE — TELEPHONE ENCOUNTER
Patient needs updated blood work and has previously placed orders 02.2024. Please contact patient to go for labs.   30D Courtesy refill provided.

## 2024-07-25 DIAGNOSIS — E78.5 HYPERLIPIDEMIA, UNSPECIFIED HYPERLIPIDEMIA TYPE: ICD-10-CM

## 2024-07-25 RX ORDER — SIMVASTATIN 80 MG
80 TABLET ORAL
Qty: 30 TABLET | Refills: 0 | Status: SHIPPED | OUTPATIENT
Start: 2024-07-25

## 2024-08-22 LAB
ALBUMIN SERPL-MCNC: 4.7 G/DL (ref 3.6–5.1)
ALBUMIN/CREAT UR: 14 MG/G CREAT
ALBUMIN/GLOB SERPL: 1.7 (CALC) (ref 1–2.5)
ALP SERPL-CCNC: 78 U/L (ref 35–144)
ALT SERPL-CCNC: 12 U/L (ref 9–46)
AST SERPL-CCNC: 18 U/L (ref 10–35)
BILIRUB SERPL-MCNC: 0.8 MG/DL (ref 0.2–1.2)
BUN SERPL-MCNC: 15 MG/DL (ref 7–25)
BUN/CREAT SERPL: ABNORMAL (CALC) (ref 6–22)
CALCIUM SERPL-MCNC: 9.8 MG/DL (ref 8.6–10.3)
CHLORIDE SERPL-SCNC: 103 MMOL/L (ref 98–110)
CHOLEST SERPL-MCNC: 157 MG/DL
CHOLEST/HDLC SERPL: 2.7 (CALC)
CO2 SERPL-SCNC: 28 MMOL/L (ref 20–32)
CREAT SERPL-MCNC: 1.05 MG/DL (ref 0.7–1.28)
CREAT UR-MCNC: 103 MG/DL (ref 20–320)
GFR/BSA.PRED SERPLBLD CYS-BASED-ARV: 74 ML/MIN/1.73M2
GLOBULIN SER CALC-MCNC: 2.8 G/DL (CALC) (ref 1.9–3.7)
GLUCOSE SERPL-MCNC: 101 MG/DL (ref 65–99)
HDLC SERPL-MCNC: 59 MG/DL
LDLC SERPL CALC-MCNC: 83 MG/DL (CALC)
MICROALBUMIN UR-MCNC: 1.4 MG/DL
NONHDLC SERPL-MCNC: 98 MG/DL (CALC)
POTASSIUM SERPL-SCNC: 3.9 MMOL/L (ref 3.5–5.3)
PROT SERPL-MCNC: 7.5 G/DL (ref 6.1–8.1)
SODIUM SERPL-SCNC: 142 MMOL/L (ref 135–146)
TRIGL SERPL-MCNC: 74 MG/DL

## 2024-08-23 DIAGNOSIS — E78.5 HYPERLIPIDEMIA, UNSPECIFIED HYPERLIPIDEMIA TYPE: ICD-10-CM

## 2024-08-23 RX ORDER — SIMVASTATIN 80 MG
80 TABLET ORAL
Qty: 30 TABLET | Refills: 5 | Status: SHIPPED | OUTPATIENT
Start: 2024-08-23

## 2024-09-04 ENCOUNTER — OFFICE VISIT (OUTPATIENT)
Dept: FAMILY MEDICINE CLINIC | Facility: CLINIC | Age: 74
End: 2024-09-04
Payer: COMMERCIAL

## 2024-09-04 VITALS
HEIGHT: 73 IN | DIASTOLIC BLOOD PRESSURE: 80 MMHG | BODY MASS INDEX: 29.69 KG/M2 | RESPIRATION RATE: 16 BRPM | WEIGHT: 224 LBS | OXYGEN SATURATION: 98 % | SYSTOLIC BLOOD PRESSURE: 132 MMHG | HEART RATE: 74 BPM

## 2024-09-04 DIAGNOSIS — Z00.00 MEDICARE ANNUAL WELLNESS VISIT, SUBSEQUENT: ICD-10-CM

## 2024-09-04 DIAGNOSIS — E11.9 DIABETES MELLITUS WITHOUT COMPLICATION (HCC): ICD-10-CM

## 2024-09-04 DIAGNOSIS — Z12.11 SCREENING FOR COLON CANCER: ICD-10-CM

## 2024-09-04 DIAGNOSIS — I42.2 HYPERTROPHIC CARDIOMYOPATHY (HCC): ICD-10-CM

## 2024-09-04 DIAGNOSIS — I10 ESSENTIAL HYPERTENSION: Primary | ICD-10-CM

## 2024-09-04 DIAGNOSIS — E78.00 HYPERCHOLESTEROLEMIA: ICD-10-CM

## 2024-09-04 DIAGNOSIS — E78.5 HYPERLIPIDEMIA, UNSPECIFIED HYPERLIPIDEMIA TYPE: ICD-10-CM

## 2024-09-04 LAB
LEFT EYE DIABETIC RETINOPATHY: ABNORMAL
LEFT EYE IMAGE QUALITY: ABNORMAL
LEFT EYE MACULAR EDEMA: ABNORMAL
LEFT EYE OTHER RETINOPATHY: ABNORMAL
RIGHT EYE DIABETIC RETINOPATHY: ABNORMAL
RIGHT EYE IMAGE QUALITY: ABNORMAL
RIGHT EYE MACULAR EDEMA: ABNORMAL
RIGHT EYE OTHER RETINOPATHY: ABNORMAL
SEVERITY (EYE EXAM): ABNORMAL
SL AMB POCT HEMOGLOBIN AIC: 5.7 (ref ?–6.5)

## 2024-09-04 PROCEDURE — 99214 OFFICE O/P EST MOD 30 MIN: CPT | Performed by: FAMILY MEDICINE

## 2024-09-04 PROCEDURE — G0439 PPPS, SUBSEQ VISIT: HCPCS | Performed by: FAMILY MEDICINE

## 2024-09-04 PROCEDURE — 83036 HEMOGLOBIN GLYCOSYLATED A1C: CPT | Performed by: FAMILY MEDICINE

## 2024-09-04 RX ORDER — BLOOD SUGAR DIAGNOSTIC
STRIP MISCELLANEOUS
Qty: 100 EACH | Refills: 3 | Status: SHIPPED | OUTPATIENT
Start: 2024-09-04

## 2024-09-04 RX ORDER — METOPROLOL TARTRATE 25 MG/1
25 TABLET, FILM COATED ORAL EVERY 12 HOURS SCHEDULED
Qty: 180 TABLET | Refills: 2 | Status: SHIPPED | OUTPATIENT
Start: 2024-09-04

## 2024-09-04 RX ORDER — SIMVASTATIN 80 MG
80 TABLET ORAL
Qty: 90 TABLET | Refills: 2 | Status: SHIPPED | OUTPATIENT
Start: 2024-09-04

## 2024-09-04 RX ORDER — DILTIAZEM HYDROCHLORIDE 240 MG/1
240 CAPSULE, COATED, EXTENDED RELEASE ORAL
Qty: 90 CAPSULE | Refills: 2 | Status: SHIPPED | OUTPATIENT
Start: 2024-09-04

## 2024-09-04 RX ORDER — LANCETS 33 GAUGE
EACH MISCELLANEOUS
Qty: 100 EACH | Refills: 3 | Status: SHIPPED | OUTPATIENT
Start: 2024-09-04

## 2024-09-04 RX ORDER — BLOOD-GLUCOSE METER
KIT MISCELLANEOUS
Qty: 1 KIT | Refills: 0 | Status: SHIPPED | OUTPATIENT
Start: 2024-09-04

## 2024-09-04 NOTE — ASSESSMENT & PLAN NOTE
A1C done today and was 5.7. Continue metformin 500 mg with dinner. Advised pt to continue a low carb diet and to exercise on a regular basis. Patient told to schedule eye exam. Foot exam done in February 2024. Urine albumin/creatinine ratio done in August 2024. IRIS exam done today.     Lab Results   Component Value Date    HGBA1C 5.7 02/01/2024

## 2024-09-04 NOTE — PROGRESS NOTES
Ambulatory Visit  Name: Lazaro Mena      : 1950      MRN: [id number]  Encounter Provider: Satish Robles MD  Encounter Date: 2024   Encounter department: [facility] MEDICINE    Assessment & Plan   1. Essential hypertension  Assessment & Plan:  Blood pressure good. Continue diltiazem 240 mg daily at bedtime and metoprolol 25 mg twice a day. Pt advised to continue low Na diet and to exercise on a regular basis.  Orders:  -     diltiazem (CARDIZEM CD) 240 mg 24 hr capsule; Take 1 capsule (240 mg total) by mouth daily at bedtime  2. Medicare annual wellness visit, subsequent  Assessment & Plan:  Wellness exam done. Last Tdap was in . Pt had prevnar 13 and pneumovacc 23. Recommend COVID vaccines, Flu shot, and Shingrix series. Labs are up to date. Discussed screening for Colon Cancer. No recent falls. Mood good.   3. Screening for colon cancer  -     Cologuard  4. Hypercholesterolemia  Assessment & Plan:  LDL 83 and LFTs normal in 2024. Continue simvastatin 80 mg qhs. Advised pt to follow a low cholesterol diet and to exercise on a regular basis.   5. Diabetes mellitus without complication (HCC)  Assessment & Plan:  A1C done today and was 5.7. Continue metformin 500 mg with dinner. Advised pt to continue a low carb diet and to exercise on a regular basis. Patient told to schedule eye exam. Foot exam done in 2024. Urine albumin/creatinine ratio done in 2024. IRIS exam done today.     Lab Results   Component Value Date    HGBA1C 5.7 2024     Orders:  -     POCT hemoglobin A1c  -     IRIS Diabetic eye exam  -     Blood Glucose Monitoring Suppl (OneTouch Verio Reflect) w/Device KIT; Check blood sugars once daily. Please substitute with appropriate alternative as covered by patient's insurance. Dx: E11.65  -     glucose blood (OneTouch Verio) test strip; Check blood sugars once daily. Please substitute with appropriate alternative as covered by patient's  insurance. Dx: E11.65  -     OneTouch Delica Lancets 33G MISC; Check blood sugars once daily. Please substitute with appropriate alternative as covered by patient's insurance. Dx: E11.65  -     metFORMIN (GLUCOPHAGE) 500 mg tablet; Take 1 tablet (500 mg total) by mouth every evening  6. Hypertrophic cardiomyopathy (HCC)  Assessment & Plan:  Patient saw Cardiology in April 2023 and had MRI heart in June 2023. Continue metoprolol 25 mg twice a day. Next appointment with Cardiology is in September 2024 and will have echocardiogram.   Orders:  -     metoprolol tartrate (LOPRESSOR) 25 mg tablet; Take 1 tablet (25 mg total) by mouth every 12 (twelve) hours  7. Hyperlipidemia, unspecified hyperlipidemia type  -     simvastatin (ZOCOR) 80 mg tablet; Take 1 tablet (80 mg total) by mouth daily at bedtime      Depression Screening and Follow-up Plan: Patient was screened for depression during today's encounter. They screened negative with a PHQ-2 score of 0.      Preventive health issues were discussed with patient, and age appropriate screening tests were ordered as noted in patient's After Visit Summary. Personalized health advice and appropriate referrals for health education or preventive services given if needed, as noted in patient's After Visit Summary.    History of Present Illness     Patient here for wellness exam and follow-up Hypertension, Hyperlipidemia, DM, Hypertrophic CMP. Patient doing well. No chest pain or shortness of breath. No headaches. No abdominal pain. Patient to see Cardiology in 2 weeks and will have echocardiogram for follow-up. Blood pressure has been good. Follows low carbohydrate diet and sugars very good. Patient exercises. No new complaints.        Patient Care Team:  Satish Robles MD as PCP - General (Family Medicine)  Satish Robles MD as PCP - PCP-[facility] ([facility])    Review of Systems   Constitutional:  Negative for fatigue and unexpected weight change.   Respiratory:  Negative for  cough and shortness of breath.    Cardiovascular:  Negative for chest pain.   Gastrointestinal:  Negative for abdominal pain, constipation, diarrhea and vomiting.   Musculoskeletal:  Negative for arthralgias.   Neurological:  Negative for dizziness and headaches.   Psychiatric/Behavioral:  Negative for dysphoric mood. The patient is not nervous/anxious.      Medical History Reviewed by provider this encounter:  Tobacco  Allergies  Meds  Problems  Med Hx  Surg Hx  Fam Hx       Annual Wellness Visit Questionnaire   Lazaro is here for his Subsequent Wellness visit.     Health Risk Assessment:   Patient rates overall health as very good. Patient feels that their physical health rating is same. Patient is satisfied with their life. Eyesight was rated as same. Hearing was rated as same. Patient feels that their emotional and mental health rating is same. Patients states they are sometimes angry. Patient states they are sometimes unusually tired/fatigued. Pain experienced in the last 7 days has been some. Patient's pain rating has been 4/10. Patient states that he has experienced weight loss or gain in last 6 months. knee    Depression Screening:   PHQ-2 Score: 0      Fall Risk Screening:   In the past year, patient has experienced: history of falling in past year    Number of falls: 1  Injured during fall?: No    Feels unsteady when standing or walking?: No    Worried about falling?: No      Home Safety:  Patient has trouble with stairs inside or outside of their home. Patient has working smoke alarms and has working carbon monoxide detector. Home safety hazards include: none.     Nutrition:   Current diet is Regular.     Medications:   Patient is currently taking over-the-counter supplements. OTC medications include: see medication list. Patient is able to manage medications.     Activities of Daily Living (ADLs)/Instrumental Activities of Daily Living (IADLs):   Walk and transfer into and out of bed and  chair?: Yes  Dress and groom yourself?: Yes    Bathe or shower yourself?: Yes    Feed yourself? Yes  Do your laundry/housekeeping?: Yes  Manage your money, pay your bills and track your expenses?: Yes  Make your own meals?: Yes    Do your own shopping?: Yes    Previous Hospitalizations:   Any hospitalizations or ED visits within the last 12 months?: No      Advance Care Planning:   Living will: No    Durable POA for healthcare: No    Advanced directive: No    Advanced directive counseling given: Yes    Five wishes given: No    Patient declined ACP directive: Yes      Cognitive Screening:   Provider or family/friend/caregiver concerned regarding cognition?: No    PREVENTIVE SCREENINGS      Cardiovascular Screening:    General: Screening Not Indicated and History Lipid Disorder      Diabetes Screening:     General: Screening Not Indicated and History Diabetes      Colorectal Cancer Screening:       Due for: Cologuard      Prostate Cancer Screening:    General: Screening Not Indicated      Osteoporosis Screening:    General: Screening Not Indicated      Abdominal Aortic Aneurysm (AAA) Screening:    Risk factors include: age between 65-74 yo        General: Screening Not Indicated      Lung Cancer Screening:     General: Screening Not Indicated    Screening, Brief Intervention, and Referral to Treatment (SBIRT)    Screening  Typical number of drinks in a day: 0  Typical number of drinks in a week: 0  Interpretation: Low risk drinking behavior.    AUDIT-C Screenin) How often did you have a drink containing alcohol in the past year? never  2) How many drinks did you have on a typical day when you were drinking in the past year? 0  3) How often did you have 6 or more drinks on one occasion in the past year? never    AUDIT-C Score: 0  Interpretation: Score 0-3 (male): Negative screen for alcohol misuse    Single Item Drug Screening:  How often have you used an illegal drug (including marijuana) or a prescription  "medication for non-medical reasons in the past year? never    Single Item Drug Screen Score: 0  Interpretation: Negative screen for possible drug use disorder    Brief Intervention  Alcohol & drug use screenings were reviewed. No concerns regarding substance use disorder identified.     Social Determinants of Health     Financial Resource Strain: Low Risk  (8/3/2023)    Overall Financial Resource Strain (CARDIA)    • Difficulty of Paying Living Expenses: Not hard at all   Food Insecurity: No Food Insecurity (9/4/2024)    Hunger Vital Sign    • Worried About Running Out of Food in the Last Year: Never true    • Ran Out of Food in the Last Year: Never true   Transportation Needs: No Transportation Needs (9/4/2024)    PRAPARE - Transportation    • Lack of Transportation (Medical): No    • Lack of Transportation (Non-Medical): No   Housing Stability: Unknown (9/4/2024)    Housing Stability Vital Sign    • Unable to Pay for Housing in the Last Year: No    • Homeless in the Last Year: No   Utilities: Not At Risk (9/4/2024)    [facility] Utilities    • Threatened with loss of utilities: No     No results found.    Objective     /80 (BP Location: Left arm, Patient Position: Sitting, Cuff Size: Large)   Pulse 74   Resp 16   Ht 6' 1\" (1.854 m)   Wt 102 kg (224 lb)   SpO2 98%   BMI 29.55 kg/m²     Physical Exam  Vitals and nursing note reviewed.   Constitutional:       Appearance: Normal appearance. He is normal weight.   Neck:      Vascular: No carotid bruit.   Cardiovascular:      Rate and Rhythm: Normal rate and regular rhythm.      Heart sounds: Murmur heard.   Pulmonary:      Effort: Pulmonary effort is normal.      Breath sounds: Normal breath sounds. No wheezing.   Musculoskeletal:      Cervical back: Normal range of motion and neck supple. No muscular tenderness.      Right lower leg: No edema.      Left lower leg: No edema.   Lymphadenopathy:      Cervical: No cervical adenopathy.   Neurological:      Mental " Status: He is alert.   Psychiatric:         Mood and Affect: Mood normal.         Behavior: Behavior normal.         Thought Content: Thought content normal.         Judgment: Judgment normal.

## 2024-09-04 NOTE — ASSESSMENT & PLAN NOTE
Patient saw Cardiology in April 2023 and had MRI heart in June 2023. Continue metoprolol 25 mg twice a day. Next appointment with Cardiology is in September 2024 and will have echocardiogram.

## 2024-09-04 NOTE — ASSESSMENT & PLAN NOTE
Wellness exam done. Last Tdap was in 2012. Pt had prevnar 13 and pneumovacc 23. Recommend COVID vaccines, Flu shot, and Shingrix series. Labs are up to date. Discussed screening for Colon Cancer. No recent falls. Mood good.

## 2024-09-04 NOTE — PATIENT INSTRUCTIONS
Medicare Preventive Visit Patient Instructions  Thank you for completing your Welcome to Medicare Visit or Medicare Annual Wellness Visit today. Your next wellness visit will be due in one year (9/5/2025).  The screening/preventive services that you may require over the next 5-10 years are detailed below. Some tests may not apply to you based off risk factors and/or age. Screening tests ordered at today's visit but not completed yet may show as past due. Also, please note that scanned in results may not display below.  Preventive Screenings:  Service Recommendations Previous Testing/Comments   Colorectal Cancer Screening  Colonoscopy    Fecal Occult Blood Test (FOBT)/Fecal Immunochemical Test (FIT)  Fecal DNA/Cologuard Test  Flexible Sigmoidoscopy Age: 45-75 years old   Colonoscopy: every 10 years (May be performed more frequently if at higher risk)  OR  FOBT/FIT: every 1 year  OR  Cologuard: every 3 years  OR  Sigmoidoscopy: every 5 years  Screening may be recommended earlier than age 45 if at higher risk for colorectal cancer. Also, an individualized decision between you and your healthcare provider will decide whether screening between the ages of 76-85 would be appropriate. Colonoscopy: Not on file  FOBT/FIT: Not on file  Cologuard: Not on file  Sigmoidoscopy: Not on file          Prostate Cancer Screening Individualized decision between patient and health care provider in men between ages of 55-69   Medicare will cover every 12 months beginning on the day after your 50th birthday PSA: 3.10 ng/mL     Screening Not Indicated     Hepatitis C Screening Once for adults born between 1945 and 1965  More frequently in patients at high risk for Hepatitis C Hep C Antibody: Not on file        Diabetes Screening 1-2 times per year if you're at risk for diabetes or have pre-diabetes Fasting glucose: No results in last 5 years (No results in last 5 years)  A1C: 5.7 (2/1/2024)  Screening Not Indicated  History Diabetes    Cholesterol Screening Once every 5 years if you don't have a lipid disorder. May order more often based on risk factors. Lipid panel: 08/21/2024  Screening Not Indicated  History Lipid Disorder      Other Preventive Screenings Covered by Medicare:  Abdominal Aortic Aneurysm (AAA) Screening: covered once if your at risk. You're considered to be at risk if you have a family history of AAA or a male between the age of 65-75 who smoking at least 100 cigarettes in your lifetime.  Lung Cancer Screening: covers low dose CT scan once per year if you meet all of the following conditions: (1) Age 55-77; (2) No signs or symptoms of lung cancer; (3) Current smoker or have quit smoking within the last 15 years; (4) You have a tobacco smoking history of at least 20 pack years (packs per day x number of years you smoked); (5) You get a written order from a healthcare provider.  Glaucoma Screening: covered annually if you're considered high risk: (1) You have diabetes OR (2) Family history of glaucoma OR (3)  aged 50 and older OR (4)  American aged 65 and older  Osteoporosis Screening: covered every 2 years if you meet one of the following conditions: (1) Have a vertebral abnormality; (2) On glucocorticoid therapy for more than 3 months; (3) Have primary hyperparathyroidism; (4) On osteoporosis medications and need to assess response to drug therapy.  HIV Screening: covered annually if you're between the age of 15-65. Also covered annually if you are younger than 15 and older than 65 with risk factors for HIV infection. For pregnant patients, it is covered up to 3 times per pregnancy.    Immunizations:  Immunization Recommendations   Influenza Vaccine Annual influenza vaccination during flu season is recommended for all persons aged >= 6 months who do not have contraindications   Pneumococcal Vaccine   * Pneumococcal conjugate vaccine = PCV13 (Prevnar 13), PCV15 (Vaxneuvance), PCV20 (Prevnar 20)  *  Pneumococcal polysaccharide vaccine = PPSV23 (Pneumovax) Adults 19-65 yo with certain risk factors or if 65+ yo  If never received any pneumonia vaccine: recommend Prevnar 20 (PCV20)  Give PCV20 if previously received 1 dose of PCV13 or PPSV23   Hepatitis B Vaccine 3 dose series if at intermediate or high risk (ex: diabetes, end stage renal disease, liver disease)   Respiratory syncytial virus (RSV) Vaccine - COVERED BY MEDICARE PART D  * RSVPreF3 (Arexvy) CDC recommends that adults 60 years of age and older may receive a single dose of RSV vaccine using shared clinical decision-making (SCDM)   Tetanus (Td) Vaccine - COST NOT COVERED BY MEDICARE PART B Following completion of primary series, a booster dose should be given every 10 years to maintain immunity against tetanus. Td may also be given as tetanus wound prophylaxis.   Tdap Vaccine - COST NOT COVERED BY MEDICARE PART B Recommended at least once for all adults. For pregnant patients, recommended with each pregnancy.   Shingles Vaccine (Shingrix) - COST NOT COVERED BY MEDICARE PART B  2 shot series recommended in those 19 years and older who have or will have weakened immune systems or those 50 years and older     Health Maintenance Due:      Topic Date Due   • Hepatitis C Screening  Never done   • Colorectal Cancer Screening  Never done     Immunizations Due:      Topic Date Due   • COVID-19 Vaccine (1 - 2023-24 season) Never done   • Influenza Vaccine (1) 09/01/2024     Advance Directives   What are advance directives?  Advance directives are legal documents that state your wishes and plans for medical care. These plans are made ahead of time in case you lose your ability to make decisions for yourself. Advance directives can apply to any medical decision, such as the treatments you want, and if you want to donate organs.   What are the types of advance directives?  There are many types of advance directives, and each state has rules about how to use them.  You may choose a combination of any of the following:  Living will:  This is a written record of the treatment you want. You can also choose which treatments you do not want, which to limit, and which to stop at a certain time. This includes surgery, medicine, IV fluid, and tube feedings.   Durable power of  for healthcare (DPAHC):  This is a written record that states who you want to make healthcare choices for you when you are unable to make them for yourself. This person, called a proxy, is usually a family member or a friend. You may choose more than 1 proxy.  Do not resuscitate (DNR) order:  A DNR order is used in case your heart stops beating or you stop breathing. It is a request not to have certain forms of treatment, such as CPR. A DNR order may be included in other types of advance directives.  Medical directive:  This covers the care that you want if you are in a coma, near death, or unable to make decisions for yourself. You can list the treatments you want for each condition. Treatment may include pain medicine, surgery, blood transfusions, dialysis, IV or tube feedings, and a ventilator (breathing machine).  Values history:  This document has questions about your views, beliefs, and how you feel and think about life. This information can help others choose the care that you would choose.  Why are advance directives important?  An advance directive helps you control your care. Although spoken wishes may be used, it is better to have your wishes written down. Spoken wishes can be misunderstood, or not followed. Treatments may be given even if you do not want them. An advance directive may make it easier for your family to make difficult choices about your care.   Fall Prevention    Fall prevention  includes ways to make your home and other areas safer. It also includes ways you can move more carefully to prevent a fall. Health conditions that cause changes in your blood pressure, vision, or  muscle strength and coordination may increase your risk for falls. Medicines may also increase your risk for falls if they make you dizzy, weak, or sleepy.   Fall prevention tips:   Stand or sit up slowly.    Use assistive devices as directed.    Wear shoes that fit well and have soles that .    Wear a personal alarm.    Stay active.    Manage your medical conditions.    Home Safety Tips:  Add items to prevent falls in the bathroom.    Keep paths clear.    Install bright lights in your home.    Keep items you use often on shelves within reach.    Paint or place reflective tape on the edges of your stairs.    Weight Management   Why it is important to manage your weight:  Being overweight increases your risk of health conditions such as heart disease, high blood pressure, type 2 diabetes, and certain types of cancer. It can also increase your risk for osteoarthritis, sleep apnea, and other respiratory problems. Aim for a slow, steady weight loss. Even a small amount of weight loss can lower your risk of health problems.  How to lose weight safely:  A safe and healthy way to lose weight is to eat fewer calories and get regular exercise. You can lose up about 1 pound a week by decreasing the number of calories you eat by 500 calories each day.   Healthy meal plan for weight management:  A healthy meal plan includes a variety of foods, contains fewer calories, and helps you stay healthy. A healthy meal plan includes the following:  Eat whole-grain foods more often.  A healthy meal plan should contain fiber. Fiber is the part of grains, fruits, and vegetables that is not broken down by your body. Whole-grain foods are healthy and provide extra fiber in your diet. Some examples of whole-grain foods are whole-wheat breads and pastas, oatmeal, brown rice, and bulgur.  Eat a variety of vegetables every day.  Include dark, leafy greens such as spinach, kale, maria alejandra greens, and mustard greens. Eat yellow and orange  vegetables such as carrots, sweet potatoes, and winter squash.   Eat a variety of fruits every day.  Choose fresh or canned fruit (canned in its own juice or light syrup) instead of juice. Fruit juice has very little or no fiber.  Eat low-fat dairy foods.  Drink fat-free (skim) milk or 1% milk. Eat fat-free yogurt and low-fat cottage cheese. Try low-fat cheeses such as mozzarella and other reduced-fat cheeses.  Choose meat and other protein foods that are low in fat.  Choose beans or other legumes such as split peas or lentils. Choose fish, skinless poultry (chicken or turkey), or lean cuts of red meat (beef or pork). Before you cook meat or poultry, cut off any visible fat.   Use less fat and oil.  Try baking foods instead of frying them. Add less fat, such as margarine, sour cream, regular salad dressing and mayonnaise to foods. Eat fewer high-fat foods. Some examples of high-fat foods include french fries, doughnuts, ice cream, and cakes.  Eat fewer sweets.  Limit foods and drinks that are high in sugar. This includes candy, cookies, regular soda, and sweetened drinks.  Exercise:  Exercise at least 30 minutes per day on most days of the week. Some examples of exercise include walking, biking, dancing, and swimming. You can also fit in more physical activity by taking the stairs instead of the elevator or parking farther away from stores. Ask your healthcare provider about the best exercise plan for you.      © Copyright [employer] 2018 Information is for End User's use only and may not be sold, redistributed or otherwise used for commercial purposes. All illustrations and images included in CareNotes® are the copyrighted property of [employer]M., Inc. or [employer]

## 2024-09-04 NOTE — ASSESSMENT & PLAN NOTE
Blood pressure good. Continue diltiazem 240 mg daily at bedtime and metoprolol 25 mg twice a day. Pt advised to continue low Na diet and to exercise on a regular basis.

## 2024-09-04 NOTE — ASSESSMENT & PLAN NOTE
LDL 83 and LFTs normal in August 2024. Continue simvastatin 80 mg qhs. Advised pt to follow a low cholesterol diet and to exercise on a regular basis.

## 2024-09-16 NOTE — PROGRESS NOTES
HCM Clinic Follow-up Visit- Cardiology   Lazaro Mena 74 y.o. male MRN: [id number]  Unit/Bed#:  Encounter: [id number]    Patient Active Problem List    Diagnosis Date Noted    Hypertrophic cardiomyopathy (HCC) 12/15/2022    Medicare annual wellness visit, subsequent 01/29/2021    Essential hypertension 01/23/2021    Diabetes mellitus without complication (HCC) 01/23/2021    Hypercholesterolemia 01/23/2021    MARYAM (generalized anxiety disorder) 01/23/2021    RICHARD (obstructive sleep apnea) 01/23/2021     Plan: Mr Mena was last seen in the HCM Clinic on 2-. He has genotype positive (MYBPC3) obstructive HCM with 10% LGE on his CMR performed in June 2023. His 41 year old daughter who lives with him has also tested positive for the pathogenic mutation but has not had any formal evaluation. His son has not been screened. He lives in [place]. Mr Mena's Bp has been under good control and although it measured 132/70 mmHg, home measurements from July 1st are consistently at or below 120 systolic and 70 diatolic with heart rates ranging from 43 to 65 bpm. He does remain relatively active and has no overt cardiac symptoms at the level of activity that he has. He denies chest discomfort, dyspnea exertion and palpitations, or orthopnea. He does note occasional lightheadedness/dizziness with quick position change but denies any near-syncope or aline syncope. He does have trace right sided lower extremity pitting edema with significant varicosity and has venous insufficiency in both legs. He was advised to elevates his legs when resting and wear compression socks when anticipating to be on his feet for some time. On examination, he does have atypical murmur of dynamic LVOT obstruction and a transthoracic echocardiogram performed today in the office shows:      Left Ventricle: Wall thickness is severely increased. There is severe asymmetric hypertrophy of the septal wall. The left ventricular ejection fraction is  64%. Systolic function is normal. Global longitudinal strain is reduced at -16%. Wall motion is normal. Diastolic function is mildly abnormal, consistent with grade I (abnormal) relaxation. There is  outflow tract dynamic obstruction at rest with a peak gradient of 20.0 mmHg. There is outflow tract dynamic obstruction with valsalva with a peak gradient of 57.0 mmHg.    Left Atrium: The atrium is moderately dilated.    Mitral Valve: There is systolic anterior motion of the posterior leaflet and anterior leaflet with late peaking gradient.    Recent blood work (9-4-2024) has shown a hemoglobin A1C of 5,7% and recent lipid profile indicate an LDL of 83 mg/dL on metformin and simvastatin. His BMI is nearly 30 kg/m2 and is encouraged to lose some weight with a goal of a BMI of <27 kg/m2 first by eliminating calories from unhealthy snacking, refined sugar and excess carbohydrates and by engaging in regular moderate intensity exercise to gradually reaching equivalent of 30 minutes of walking 5 days a week and light weight resistance exercise for the remaining 2 days of the week.    Physician Requesting Consult: Juan Jose Cohn MD  Reason for Consult / Principal Problem: HCM     HPI: Lazaro Mena is a 73 y.o. year old male with past cardiovascular history of hypertension and hypercholesterolemia who was seen by Dr Cohn for evaluation of newly diagnosed HCM phenotype on echocardiography. He does also have history of diabetes and obstructive sleep apnea. The index echocardiogram was performed on 11- and showed:       Left Ventricle: Left ventricular cavity size is normal. Wall thickness is increased. There is severe asymmetric hypertrophy of the septal wall. The left ventricular ejection fraction is 65%. Systolic function is vigorous. Global longitudinal strain is reduced at 15%. Wall motion is normal. Diastolic function is mildly abnormal, consistent with grade I (abnormal) relaxation. There is  outflow tract  dynamic obstruction at rest with a peak gradient of 60 mmgHg. There is outflow tract dynamic obstruction with valsalva with a peak gradient of 77.0 mmHg.    Left Atrium: The atrium is mildly dilated.    Aortic Valve: There is aortic sclerosis.    Mitral Valve: There is mild diffuse thickening. The leaflets are elonaged. The leaflets exhibit normal mobility. There is mild to moderate regurgitation. There is systolic anterior motion of the anterior leaflet with late peaking gradient.    Aorta: The ascending aorta is mildly dilated at 41mm.          Review of the study shows a maximum wall thickness of 26 mm in basal septum, bi-leaflet DAYA and mild mitral regurgitation.      CMR performed on 6-1-2023 showed:     Impression:  1. 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 Basal septum measures up to 21 mm.  2. Normal right ventricular size and systolic function.  3. Moderately dilated left atrium. Mitral regurgitation visualized.  4. Interstitial and replacement fibrosis in the hypertrophied segments involving approximately 10% of the total myocardium          A 7-day extended monitor (5-5 to 5-) showed:     Patient had a min HR of 38 bpm, max HR of 187 bpm, and avg HR of 66 bpm. Predominant underlying rhythm was Sinus Rhythm. First Degree AV Block was present. 3 Ventricular Tachycardia runs occurred, the run with the fastest interval lasting 4 beats with a max rate of 187 bpm, the longest lasting 5 beats with an avg rate of 116 bpm. 5 Supraventricular Tachycardia runs occurred, the run with the fastest interval lasting 13 beats with a max rate of 119 bpm (avg 105 bpm); the run with the fastest interval was also the longest. Isolated SVEs were rare (<1.0%), SVE Couplets were rare (<1.0%), and SVE Triplets were rare (<1.0%). Isolated VEs were rare (<1.0%, 1659), VE Couplets were rare (<1.0%, 32), and VE Triplets were rare (<1.0%, 4). Ventricular Bigeminy was  present.     8-8-2023: Mr Lazaro Mena has obstructive hypertrophic cardiomyopathy. Today he notes he overall feels well. He is very active at home and has no cardiac complaints.  With regards to his overall health, he has lost weight over the past year having lost almost 15 lbs since February alone and he has done this by significantly reducing carbohydrates and total calories in his diet. His HgbA1c has reduced to 5.2 from 6.5 in July 2022 and he notes his PCP has discussed taking him off of his metformin given his weight loss which has helped with his HgbA1c reduction. His blood pressure today is 135/89 and he states this weight loss has helped his readings. He remains on amlodipine 10 mg daily, benazapril 40 mg daily and metoprolol tartrate 25 mg twice daily which we will continue for now. He follows a low sodium diet. Recent Lipid panel shows triglycerides 66, HDL 54 and LDL 69 and he takes simvastatin 80 mg daily. He has presumed RICHARD given history of snoring but he has had no formal sleep study. Since he has lost weight he feels his snoring has reduced significantly. He also feels his weight loss have given him more energy. 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 To complete his risk stratification for HCM we will order exercise bike stress echo and we will also order genetic testing. We spoke in detail about HCM, exercise recommendations, life style modification, hydration and possible need to switch him from vasodilators to other antihypertensive medications after completion of risk stratification. At this time, he has no indication for primary prevention ICD. Educational material and contact information were provided to the patient.    2-6-2024: Since his lats office visit on 8-8-2023 he underwent an exercise stress echo on 9- with the following results:  Left Ventricle: Wall thickness is severely increased. There is severe asymmetric hypertrophy of the septal wall. The left ventricular ejection fraction is 72%. Systolic function is hyperdynamic. Wall motion is normal. Diastolic function is mildly abnormal, consistent with grade I (abnormal) relaxation. There is outflow tract dynamic obstruction at rest with a peak gradient of 27.0 mmHg. There is outflow tract dynamic obstruction with valsalva with a peak gradient of 158.0 mmHg. Left Atrium: The atrium is mildly dilated. Aortic Valve: There is aortic valve sclerosis. Mitral Valve: There is moderate annular calcification. There is mild regurgitation. There is systolic anterior motion of the posterior leaflet and anterior leaflet with late peaking gradient. stress ECG: No ST deviation is noted. There were no arrhythmias during recovery. . The ECG was not diagnostic due to submaximal stress. The stress ECG is negative for ischemia after submaximal exercise, without reproduction of symptoms. Post Stress Echo: Left ventricle cavity is small post-stress. The left ventricle systolic function is hyperdynamic post-stress. Marked increase in LVOT gradient was noted during exercise. Patient was limited in exercise capacity partly related to left knee pain. Echo Post Impression: The study is equivocal for myocardial ischemia, after submaximal exercise.   Given evidence of dynamic obstruction, the patient's amlodipine and benazapril were discontinued and he was started on diltiazem 240 mg QHS and his metoprolol tartrate 25 mg BID was continued.      Today, the patient offers no overt cardiac complaints.  Overall it does sound like he is somewhat sedentary throughout the day but he does build optical instruments and states that certain days he will have to climb the stairs to his second floor and back down and sometimes down to the basement and back up several times a day.  He states he can perform his activities of daily living  "with no complaints of chest discomfort, dyspnea exertion or palpitations.  He has no lower extremity edema orthopnea.  He gets very rare episodes of lightheadedness mainly \" when I moved to quick\".  I did review with the patient that he does have evidence of a left ventricular outflow tract gradient on his stress echo and I did discuss the role of mavacamten in treatment of patients with obstructive HCM.  The patient was receptive to my discussion but he states that there are no cardiac symptoms limiting his daily activities and he would prefer to continue to monitor how he feels and contact us if there are any declines. His blood pressure is mildly elevated today at 146/62 but he brings in a very detailed diary of blood pressures since this past November and his systolic blood pressure has been running consistently 110's-120's at home.  He states he will continue to monitor his blood pressure but he was nervous about this office visit today and likely this is the reasoning for his elevated blood pressure.  He does try to follow a low-sodium diet and I have reminded him to also remain well-hydrated.  His average heart rate on his blood pressure diary is in the high 40s to low 50s but the patient denies any dizziness/lightheadedness on a regular basis.  LDL in May 2023 was 69 and he continues on simvastatin 80 mg QD. HgbA1c was recently 5.7 and he continues on metformin under direction of his PCP.  Overall the patient feels he is stable and he will follow-up in our office in 6 months with an updated echocardiogram.  I did ask the patient to continue thinking about the use of mavacamten in his current condition and asked him to contact our office if he should feel a functional decline in his exercise capacity.      Risk stratification:  Nonsustained ventricular tachycardia-no  Severe left ventricular hypertrophy-no  Family history of sudden death - no  Unexplained syncope-no  LVOT obstruction-yes  Atrial fibrillation " "and left atrial dilation-yes  Age- 72 years old  NYHA- class I-II  Myocardial fibrosis-Interstitial and replacement fibrosis in the hypertrophied segments involving approximately 10% of the total myocardium  LV systolic dysfunction-no  Apical aneurysm-no     Review of systems: Denies chest discomfort, dyspnea exertion and palpitations; denies lower extremity edema and orthopnea; notes occasional lightheadedness/dizziness with quick position change but denies any near-syncope or aline syncope     Family history: Father passed away at age 82 from cancer (patient is unsure what kind) did have a \"heart condition\" but patient does not know the actual diagnosis; Mother passed away at age 87 years old but patient is not sure the cause; he has a sister who is 85 years old and two brothers who are 79 and 67 years old but he is not sure of their status as they are estranged; he has a daughter who is 41 years old and son is 39 years old both with no medical issues; his daughter has tested positive for MYBPC3 but he does not feel that his son participated in genetic testing but he is unsure; he has two granddaughters; he has no known SCD in his family     Genetic testing:        Devices: none    Historical Information   Past Medical History:   Diagnosis Date    Diabetes mellitus (HCC)     Hypertension     Low back pain     Sleep disorder      Past Surgical History:   Procedure Laterality Date    TONSILLECTOMY       Family History   Problem Relation Age of Onset    No Known Problems Mother     No Known Problems Father      Current Outpatient Medications on File Prior to Visit   Medication Sig Dispense Refill    Blood Glucose Monitoring Suppl (OneTouch Verio Reflect) w/Device KIT Check blood sugars once daily. Please substitute with appropriate alternative as covered by patient's insurance. Dx: E11.65 1 kit 0    diltiazem (CARDIZEM CD) 240 mg 24 hr capsule Take 1 capsule (240 mg total) by mouth daily at bedtime 90 capsule 2    " "glucose blood (OneTouch Verio) test strip Check blood sugars once daily. Please substitute with appropriate alternative as covered by patient's insurance. Dx: E11.65 100 each 3    metFORMIN (GLUCOPHAGE) 500 mg tablet Take 1 tablet (500 mg total) by mouth every evening 90 tablet 2    metoprolol tartrate (LOPRESSOR) 25 mg tablet Take 1 tablet (25 mg total) by mouth every 12 (twelve) hours 180 tablet 2    OneTouch Delica Lancets 33G MISC Check blood sugars once daily. Please substitute with appropriate alternative as covered by patient's insurance. Dx: E11.65 100 each 3    simvastatin (ZOCOR) 80 mg tablet Take 1 tablet (80 mg total) by mouth daily at bedtime 90 tablet 2     No current facility-administered medications on file prior to visit.     No Known Allergies  Social History     Substance and Sexual Activity   Alcohol Use Not Currently     Social History     Substance and Sexual Activity   Drug Use Never     Social History     Tobacco Use   Smoking Status Never   Smokeless Tobacco Never     Objective   Vitals:   Vitals:    09/17/24 1102   BP: 132/70   BP Location: Left arm   Patient Position: Sitting   Cuff Size: Standard   Pulse: 55   SpO2: 98%   Weight: 102 kg (224 lb)   Height: 6' 1\" (1.854 m)   Body surface area is 2.26 meters squared.  Body mass index is 29.55 kg/m².    Invasive Devices       Peripheral Intravenous Line  Duration             Peripheral IV Left;Ventral (anterior) Wrist -- days                  Physical Exam:  GEN: Lazaro Mena appears well, alert and oriented x 3, pleasant and cooperative   HEENT: pupils equal, round, and reactive to light; extraocular muscles intact  NECK: supple, no carotid bruits   HEART: regular rhythm, normal S1 and S2, 2/6 systolic murmur at the base with accentuation upon standing, no clicks, gallops or rubs   LUNGS: clear to auscultation bilaterally; no wheezes, rales, or rhonchi   ABDOMEN: normal bowel sounds, soft, no tenderness, no distention  EXTREMITIES: " "peripheral pulses normal; no clubbing or cyanosis, trace pitting edema RLE with extensive varicosity, venous insufficiency in LLE  NEURO: no focal findings   SKIN: normal without suspicious lesions on exposed skin    Lab Results:   Lab Results   Component Value Date    WBC 6.6 2023    RBC 4.81 2023    HGB 14.6 2023    HCT 42.1 2023    MCV 87.5 2023     2023    RDW 12.3 2023     Lab Results   Component Value Date    K 3.9 2024     2024    CO2 28 2024    BUN 15 2024    CREATININE 1.05 2024    EGFR 74 2024    CALCIUM 9.8 2024    AST 18 2024    ALT 12 2024    ALKPHOS 78 2024     No results found for: \"MG\"  Lab Results   Component Value Date    HDL 59 2024    TRIG 74 2024    LDLCALC 83 2024     No results found for: \"NRF5TONAZYAC\", \"T3FREE\", \"FREET4\", \"R1QFOGV\", \"D5AYVBW\"    Imaging:   I have personally reviewed pertinent films in PACS    Cardiac testing:     Name: Lazaro Mena                       : 1950  MRN: [id number]                       Age: 74 y.o.  Patient Status: Outpatient          Gender: male  Echo stress test, exercise    Height: 6' 1\" (1.854 m)   Weight: 88.5 kg (195 lb)   BSA: 2.13 m²   Blood Pressure: 122/62    Date of Study: 23   Ordering Provider: JESU Irving   Clinical Indications: Other, Please Specify in Comments - hypertrophic cardiomyopathy       [facility]  Performing Staff   Cardiology: Cb Frank MD    Tech: Bisi Pope RN   Support Staff: GWYN Porras        Height & Weight    Height Weight BSA (Calculated - m2)   6' 1\" (1.854 m) 88.5 kg (195 lb) 2.13 sq meters     PACS Images     Show images for Echo stress test, exercise  Study Details    Study quality was adequate. A limited strain echocardiogram was performed. The apical and parasternal views were obtained.     History    Htn, hld, dm     Interpretation " Summary  Show Result Comparison     Left Ventricle: Wall thickness is severely increased. There is severe asymmetric hypertrophy of the septal wall. The left ventricular ejection fraction is 72%. Systolic function is hyperdynamic. Wall motion is normal. Diastolic function is mildly abnormal, consistent with grade I (abnormal) relaxation. There is  outflow tract dynamic obstruction at rest with a peak gradient of 27.0 mmHg. There is outflow tract dynamic obstruction with valsalva with a peak gradient of 158.0 mmHg.    Left Atrium: The atrium is mildly dilated.    Aortic Valve: There is aortic valve sclerosis.    Mitral Valve: There is moderate annular calcification. There is mild regurgitation. There is systolic anterior motion of the posterior leaflet and anterior leaflet with late peaking gradient.    Stress ECG: No ST deviation is noted. There were no arrhythmias during recovery. . The ECG was not diagnostic due to submaximal stress. The stress ECG is negative for ischemia after submaximal exercise, without reproduction of symptoms.    Post Stress Echo: Left ventricle cavity is small post-stress. The left ventricle systolic function is hyperdynamic post-stress. Marked increase in LVOT gradient was noted during exercise. Patient was limited in exercise capacity partly related to left knee pain.    Echo Post Impression: The study is equivocal for myocardial ischemia, after submaximal exercise.     Strain was performed to quantify interventricular dyssynchrony and evaluate components of myocardial function due to HCM . Results from the utilization of Strain Analysis are listed in the report below.     Stress Findings    Stress Findings A bicycle protocol stress test was performed. Overall, the patient's exercise capacity was moderately impaired for their age. The patient after exercising for 4 min and 50 sec and had a maximal HR of 97 bpm (66 % of MPHR) and 3.9 METS. The patient experienced no angina during the test.  The test was stopped because the patient experienced dyspnea. The patient requested the test to be stopped. The test was stopped because of left leg pain. The patient reported dyspnea and left leg pain during the stress test. Symptoms began during stress and ended during recovery. Blood pressure demonstrated a normal response and heart rate demonstrated a normal response to stress.     Findings    Left Ventricle Left ventricular cavity size is small. Wall thickness is severely increased. There is severe asymmetric hypertrophy of the septal wall. The left ventricular ejection fraction is 72%. Systolic function is hyperdynamic.  Wall motion is normal. Diastolic function is mildly abnormal, consistent with grade I (abnormal) relaxation.  There is  outflow tract dynamic obstruction at rest with a peak gradient of 27.0 mmHg. There is outflow tract dynamic obstruction with valsalva with a peak gradient of 158.0 mmHg.   Right Ventricle Right ventricular cavity size is normal. Systolic function is normal. Wall thickness is normal.   Left Atrium The atrium is mildly dilated.   Right Atrium The atrium is normal in size.   Aortic Valve The aortic valve is trileaflet. The leaflets are mildly thickened. The leaflets are mildly calcified. The leaflets exhibit normal mobility. There is no evidence of regurgitation. There is aortic valve sclerosis.   Mitral Valve The leaflets are not thickened. The leaflets are not calcified. The leaflets exhibit normal mobility. There is moderate annular calcification. There is mild regurgitation. There is no evidence of stenosis. There is systolic anterior motion of the posterior leaflet and anterior leaflet with late peaking gradient.   Tricuspid Valve Tricuspid valve structure is normal. There is no evidence of regurgitation. There is no evidence of stenosis.   Ascending Aorta The aortic root is normal in size.   Pericardium There is no pericardial effusion. The pericardium is normal in  appearance.     Stress Echo Findings    Study Impression The study is equivocal for myocardial ischemia, after submaximal exercise.     Stress Measurements    Baseline Vitals   Baseline HR 55 bpm         Baseline /62 mmHg         O2 sat rest 99 %         Peak Stress Vitals   Stress peak HR 95 bpm         Post peak  mmHg         O2 sat peak 96 %         Max HR Percent 66 %         Max HR 97 bpm         Recovery Vitals   Recovery HR 65 bpm         Recovery /80 mmHg         O2 sat recovery 98 %          Exercise Data   Max HR 97 bpm         Exercise duration (min) 4 min         Exercise duration (sec) 50 sec         Estimated workload 3.9 METS         Angina Index 0         Rate Pressure Product 14,250              Stage Data 9/21/23 11:04 AM--9/21/23 12:25 PM    Date/Time BP HR O2 RPP Symptoms   09/21/23 1104 122/62 55 bpm 99 % 6710 none   09/21/23 1139 140/80 88 bpm 99 % 50126 left knee pain   09/21/23 1140 150/80 96 bpm 99 % 30435 left knee pain/ mild sob   09/21/23 1141 -- 96 bpm 99 % -- left knee pain/ mod sob   09/21/23 1143 150/60 95 bpm 96 % 24076 left knee pain/ mod sob   09/21/23 1146 160/80 65 bpm 98 % [id number] symptoms resolving   09/21/23 1153 122/80 65 bpm 98 % 7930 none     Left Ventricle Measurements    Function/Volumes   Left ventricular stroke volume (2D) 15 mL         Dimensions   LVIDd 2.7 cm         LVIDS 1.9 cm         IVSd 1.7 cm         LVPWd 1.6 cm         FS 30 %         Diastolic Filling   MV E' Tissue Velocity Septal 4 cm/s         E wave deceleration time 321 ms         MV Peak E Rodrigo 71 cm/s         MV Peak A Rodrigo 0.8 m/s          Other Measurements   Peak gradient (Rest) 27 mmHg         Peak Gradient (Valsalva) 158 mmHg              Mitral Valve Measurements    Stenosis   MV stenosis pressure 1/2 time 93 ms         MV valve area p 1/2 method 2.37 cm2               Exam Details    Performed Procedure Technologist Supporting Staff Performing Physician   Echo stress test,  exercise Bisi Pope, DAYA Garrido, RS Cb Frank MD         Appointment Date/Status Modality Department    9/21/2023     Completed BE STRESS 1 BE CAR NON INV           Begin Exam End Exam Begin Exam Questionnaires End Exam Questionnaires   9/21/2023 11:04 AM 9/21/2023 12:25 PM CV STRESS QUESTIONNAIRE PATIENT EDUCATION            All Reviewers List    JESU Irving on 9/21/2023  3:11 PM     Signed    Electronically signed by Cb Frank MD on 9/21/23 at 1501 EDT     MRI cardiac  w wo contrast  Status: Final result     Study Result    Narrative & Impression   Cardiac MRI with and without contrast     INDICATION: I42.2: Other hypertrophic cardiomyopathy.     Technique:  1. 3 plane SSFP localizers.  2. SSFP cine imaging in long and short axis plane.  3. T2 weighted DIR FSE in short axis plane.  4. 18 ml gadobutrol power injected.  5. 2D inversion recovery FGRE for delayed myocardial enhancement.  6. The patient tolerated the procedure well without complication.     Measurements:  Abner-septal wall 21 mm  Postero-lateral wall 8 mm  Left ventricular end-diastolic dimension 53 mm  Left ventricular end-systolic dimension 40 mm  Left ventricular end-diastolic volume 213 ml  Left ventricular end-systolic volume 85 ml  Stroke volume 128 ml  Cardiac Output 6.7 L/min  Ejection fraction 60%  Left atrium 51 mm  Aortic Root 30 mm     Findings:  1. Normal left ventricular size and systolic function. Thickened basal to mid interventricular septum measuring up to 21 mm. Systolic anterior motion of the anterior mitral valve leaflet with a dephasing jet in the left ventricular outflow tract,   suggesting a component of obstruction.  2. Normal right ventricular size and systolic function.  3. The aortic, mitral, and tricuspid valves open without restriction. Mitral regurgitation visualized with a posteriorly directed jet.  4. The left atrium is moderately dilated. The aortic root is normal in size.  5.  There is no evidence of myocardial edema.  6. Delayed post-gadolinium imaging demonstrates faint intramyocardial hyperenhancement in the hypertrophied segments with more focal dense foci of intramyocardial hyperenhancement, the total of which involves approximately 10% of the total myocardium.     IMPRESSION:  Impression:  1. 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 Basal septum measures up to 21 mm.  2. Normal right ventricular size and systolic function.  3. Moderately dilated left atrium. Mitral regurgitation visualized.  4. Interstitial and replacement fibrosis in the hypertrophied segments involving approximately 10% of the total myocardium     Counseling / Coordination of Care  Total time spent today 38 minutes.  Greater than 50% of total time was spent with the patient and / or family counseling and / or coordination of care.

## 2024-09-17 ENCOUNTER — OFFICE VISIT (OUTPATIENT)
Dept: CARDIOLOGY CLINIC | Facility: CLINIC | Age: 74
End: 2024-09-17
Payer: COMMERCIAL

## 2024-09-17 ENCOUNTER — HOSPITAL ENCOUNTER (OUTPATIENT)
Dept: NON INVASIVE DIAGNOSTICS | Facility: CLINIC | Age: 74
Discharge: HOME | End: 2024-09-17
Payer: COMMERCIAL

## 2024-09-17 VITALS
HEART RATE: 50 BPM | BODY MASS INDEX: 29.69 KG/M2 | HEIGHT: 73 IN | SYSTOLIC BLOOD PRESSURE: 132 MMHG | DIASTOLIC BLOOD PRESSURE: 80 MMHG | WEIGHT: 224 LBS

## 2024-09-17 VITALS
HEART RATE: 55 BPM | OXYGEN SATURATION: 98 % | SYSTOLIC BLOOD PRESSURE: 132 MMHG | DIASTOLIC BLOOD PRESSURE: 70 MMHG | HEIGHT: 73 IN | BODY MASS INDEX: 29.69 KG/M2 | WEIGHT: 224 LBS

## 2024-09-17 DIAGNOSIS — I10 ESSENTIAL HYPERTENSION: ICD-10-CM

## 2024-09-17 DIAGNOSIS — I42.2 HYPERTROPHIC CARDIOMYOPATHY (HCC): ICD-10-CM

## 2024-09-17 DIAGNOSIS — E78.00 HYPERCHOLESTEROLEMIA: ICD-10-CM

## 2024-09-17 DIAGNOSIS — I42.2 HYPERTROPHIC CARDIOMYOPATHY (HCC): Primary | ICD-10-CM

## 2024-09-17 LAB
AORTIC ROOT: 3.5 CM
APICAL FOUR CHAMBER EJECTION FRACTION: 61 %
AV AREA PEAK VELOCITY: 2.6 CM2
AV LVOT MEAN GRADIENT: 6 MMHG
AV LVOT PEAK GRADIENT: 10 MMHG
AV PEAK GRADIENT: 38 MMHG
BSA FOR ECHO PROCEDURE: 2.26 M2
DOP CALC LVOT CARDIAC INDEX: 4.64 L/MIN/M2
DOP CALC LVOT CARDIAC OUTPUT: 10.49 L/MIN
DOP CALC LVOT DIAMETER: 2.5 CM
DOP CALC LVOT PEAK VEL VTI: 41.82 CM
DOP CALC LVOT PEAK VEL: 1.6 M/S
DOP CALC LVOT STROKE INDEX: 88.5 ML/M2
DOP CALC LVOT STROKE VOLUME: 200
E WAVE DECELERATION TIME: 371 MS
E/A RATIO: 0.75
FRACTIONAL SHORTENING: 40 (ref 28–44)
GLOBAL LONGITUIDAL STRAIN: -16 %
INTERVENTRICULAR SEPTUM IN DIASTOLE (PARASTERNAL SHORT AXIS VIEW): 2.3 CM
INTERVENTRICULAR SEPTUM: 2.3 CM (ref 0.6–1.1)
LAAS-AP2: 30.6 CM2
LAAS-AP4: 32.7 CM2
LEFT ATRIUM SIZE: 5.2 CM
LEFT ATRIUM VOLUME (MOD BIPLANE): 115 ML
LEFT ATRIUM VOLUME INDEX (MOD BIPLANE): 50.9 ML/M2
LEFT INTERNAL DIMENSION IN SYSTOLE: 2.6 CM (ref 2.1–4)
LEFT VENTRICULAR INTERNAL DIMENSION IN DIASTOLE: 4.3 CM (ref 3.5–6)
LEFT VENTRICULAR POSTERIOR WALL IN END DIASTOLE: 1.2 CM
LEFT VENTRICULAR STROKE VOLUME: 58 ML
LVSV (TEICH): 58 ML
MV E'TISSUE VEL-LAT: 5 CM/S
MV E'TISSUE VEL-SEP: 3 CM/S
MV PEAK A VEL: 1.03 M/S
MV PEAK E VEL: 77 CM/S
MV STENOSIS PRESSURE HALF TIME: 108 MS
MV VALVE AREA P 1/2 METHOD: 2
SL CV ECHO LV DYNAMIC OBSTRUCTION PEAK GRADIENT (REST): 20 MMHG
SL CV ECHO LV DYNAMIC OBSTRUCTION PEAK GRADIENT (VALSAL: 57 MMHG
SL CV LEFT ATRIUM LENGTH A2C: 6.8 CM
SL CV LV EF: 64
SL CV PED ECHO LEFT VENTRICLE DIASTOLIC VOLUME (MOD BIPLANE) 2D: 82 ML
SL CV PED ECHO LEFT VENTRICLE SYSTOLIC VOLUME (MOD BIPLANE) 2D: 24 ML

## 2024-09-17 PROCEDURE — 93325 DOPPLER ECHO COLOR FLOW MAPG: CPT

## 2024-09-17 PROCEDURE — 93308 TTE F-UP OR LMTD: CPT

## 2024-09-17 PROCEDURE — 93321 DOPPLER ECHO F-UP/LMTD STD: CPT

## 2024-09-17 PROCEDURE — 93321 DOPPLER ECHO F-UP/LMTD STD: CPT | Performed by: INTERNAL MEDICINE

## 2024-09-17 PROCEDURE — 93325 DOPPLER ECHO COLOR FLOW MAPG: CPT | Performed by: INTERNAL MEDICINE

## 2024-09-17 PROCEDURE — 93308 TTE F-UP OR LMTD: CPT | Performed by: INTERNAL MEDICINE

## 2024-09-17 PROCEDURE — 99214 OFFICE O/P EST MOD 30 MIN: CPT | Performed by: INTERNAL MEDICINE

## 2024-10-01 ENCOUNTER — VBI (OUTPATIENT)
Dept: ADMINISTRATIVE | Facility: OTHER | Age: 74
End: 2024-10-01

## 2024-10-01 NOTE — TELEPHONE ENCOUNTER
10/01/24 11:20 AM     Chart reviewed for Hemoglobin A1c was/were submitted to the patient's insurance.     Jazmin Villegas MA   [facility]

## 2024-10-04 PROBLEM — Z00.00 MEDICARE ANNUAL WELLNESS VISIT, SUBSEQUENT: Status: RESOLVED | Noted: 2021-01-29 | Resolved: 2024-10-04

## 2024-10-13 LAB — COLOGUARD RESULT REPORTABLE: NEGATIVE

## 2024-12-21 PROBLEM — R73.03 PREDIABETES: Status: ACTIVE | Noted: 2021-01-23

## 2025-03-12 ENCOUNTER — OFFICE VISIT (OUTPATIENT)
Dept: FAMILY MEDICINE CLINIC | Facility: CLINIC | Age: 75
End: 2025-03-12
Payer: COMMERCIAL

## 2025-03-12 VITALS
DIASTOLIC BLOOD PRESSURE: 60 MMHG | WEIGHT: 216 LBS | RESPIRATION RATE: 16 BRPM | OXYGEN SATURATION: 92 % | HEART RATE: 62 BPM | SYSTOLIC BLOOD PRESSURE: 120 MMHG | HEIGHT: 73 IN | BODY MASS INDEX: 28.63 KG/M2 | TEMPERATURE: 97.3 F

## 2025-03-12 DIAGNOSIS — R09.89 DECREASED PULSES IN FEET: ICD-10-CM

## 2025-03-12 DIAGNOSIS — E11.9 DIABETES MELLITUS WITHOUT COMPLICATION (HCC): ICD-10-CM

## 2025-03-12 DIAGNOSIS — I42.2 HYPERTROPHIC CARDIOMYOPATHY (HCC): ICD-10-CM

## 2025-03-12 DIAGNOSIS — E78.00 HYPERCHOLESTEROLEMIA: ICD-10-CM

## 2025-03-12 DIAGNOSIS — I10 ESSENTIAL HYPERTENSION: Primary | ICD-10-CM

## 2025-03-12 DIAGNOSIS — Z12.5 PROSTATE CANCER SCREENING: ICD-10-CM

## 2025-03-12 LAB — SL AMB POCT HEMOGLOBIN AIC: 6 (ref ?–6.5)

## 2025-03-12 PROCEDURE — 83036 HEMOGLOBIN GLYCOSYLATED A1C: CPT | Performed by: FAMILY MEDICINE

## 2025-03-12 PROCEDURE — 99214 OFFICE O/P EST MOD 30 MIN: CPT | Performed by: FAMILY MEDICINE

## 2025-03-12 NOTE — PROGRESS NOTES
Name: Lazaro Mena      : 1950      MRN: [id number]  Encounter Provider: Satish Robles MD  Encounter Date: 3/12/2025   Encounter department: [facility]  :  Assessment & Plan  Essential hypertension  Blood pressure remains good. Continue diltiazem 240 mg daily at bedtime and metoprolol 25 mg twice a day. Pt advised to continue low Na diet and to exercise on a regular basis.  Orders:  •  Lipid panel; Future  •  Comprehensive metabolic panel; Future  •  CBC and differential; Future    Hypercholesterolemia  LDL 83 and LFTs normal in 2024. Continue simvastatin 80 mg qhs. Advised pt to follow a low cholesterol diet and to exercise on a regular basis.   Orders:  •  Lipid panel; Future  •  Comprehensive metabolic panel; Future    Diabetes mellitus without complication (HCC)  A1C done today and was 6.0. Continue metformin 500 mg with dinner. Advised pt to continue a low carb diet and to exercise on a regular basis. Foot exam done today. Patient told to schedule eye exam.     Lab Results   Component Value Date    HGBA1C 6.0 2025     Orders:  •  Albumin / creatinine urine ratio; Future  •  POCT hemoglobin A1c  •  Lipid panel; Future  •  Comprehensive metabolic panel; Future    Hypertrophic cardiomyopathy (HCC)  Patient saw Cardiology in 2023 and had MRI heart in 2023. Continue metoprolol 25 mg twice a day. Next appointment with Cardiology is in 2025.       Prostate cancer screening    Orders:  •  PSA, Total Screen; Future    Decreased pulses in feet  Will check arterial duplex.   Orders:  •  VAS ARTERIAL DUPLEX- LOWER LIMB BILATERAL; Future          Depression Screening and Follow-up Plan: Patient was screened for depression during today's encounter. They screened negative with a PHQ-2 score of 0.        History of Present Illness   Patient here for follow-up Hypertension, Hyperlipidemia, DM, Hypertrophic Cardiomyopathy. Patient doing well. No chest pain  "or shortness of breath. No headaches. No abdominal pain. Patient had echocardiogram in September 2024 and was stable. Patient to see Cardiology in April 2025 for follow-up. No pain or new complaints. Blood pressure has been good. Patient exercises at times. Follows low carbohydrate diet. No new complaints.       Review of Systems   Constitutional:  Negative for fatigue and unexpected weight change.   Respiratory:  Negative for cough and shortness of breath.    Cardiovascular:  Negative for chest pain.   Gastrointestinal:  Negative for abdominal pain, constipation, diarrhea and vomiting.   Musculoskeletal:  Negative for arthralgias.   Neurological:  Negative for dizziness and headaches.   Psychiatric/Behavioral:  Negative for dysphoric mood. The patient is not nervous/anxious.        Objective   /60 (BP Location: Left arm, Patient Position: Sitting, Cuff Size: Large)   Pulse 62   Temp (!) 97.3 °F (36.3 °C) (Tympanic)   Resp 16   Ht 6' 1\" (1.854 m)   Wt 98 kg (216 lb)   SpO2 92%   BMI 28.50 kg/m²      Physical Exam  Vitals and nursing note reviewed.   Constitutional:       Appearance: Normal appearance. He is normal weight.   Neck:      Vascular: No carotid bruit.   Cardiovascular:      Rate and Rhythm: Normal rate and regular rhythm.      Pulses: Pulses are weak.           Dorsalis pedis pulses are 1+ on the right side and 1+ on the left side.        Posterior tibial pulses are 1+ on the right side and 1+ on the left side.      Heart sounds: Murmur heard.   Pulmonary:      Effort: Pulmonary effort is normal.      Breath sounds: Normal breath sounds. No wheezing.   Musculoskeletal:      Cervical back: Normal range of motion and neck supple. No muscular tenderness.      Right lower leg: No edema.      Left lower leg: No edema.   Feet:      Right foot:      Skin integrity: No ulcer, skin breakdown, erythema, warmth, callus or dry skin.      Left foot:      Skin integrity: No ulcer, skin breakdown, erythema, " warmth, callus or dry skin.   Lymphadenopathy:      Cervical: No cervical adenopathy.   Neurological:      Mental Status: He is alert.   Psychiatric:         Mood and Affect: Mood normal.         Behavior: Behavior normal.         Thought Content: Thought content normal.         Judgment: Judgment normal.     Patient's shoes and socks removed.    Right Foot/Ankle   Right Foot Inspection  Skin Exam: skin normal and skin intact. No dry skin, no warmth, no callus, no erythema, no maceration, no abnormal color, no pre-ulcer, no ulcer and no callus.     Toe Exam: ROM and strength within normal limits. No swelling, no tenderness, erythema and  no right toe deformity    Sensory   Proprioception: intact  Monofilament testing: intact    Vascular  Capillary refills: elevated  The right DP pulse is 1+. The right PT pulse is 1+.     Left Foot/Ankle  Left Foot Inspection  Skin Exam: skin normal and skin intact. No dry skin, no warmth, no erythema, no maceration, normal color, no pre-ulcer, no ulcer and no callus.     Toe Exam: ROM and strength within normal limits. No swelling, no tenderness, no erythema and no left toe deformity.     Sensory   Proprioception: intact  Monofilament testing: intact    Vascular  Capillary refills: elevated  The left DP pulse is 1+. The left PT pulse is 1+.     Assign Risk Category  No deformity present  No loss of protective sensation  Weak pulses  Risk: 1

## 2025-03-12 NOTE — ASSESSMENT & PLAN NOTE
Blood pressure remains good. Continue diltiazem 240 mg daily at bedtime and metoprolol 25 mg twice a day. Pt advised to continue low Na diet and to exercise on a regular basis.  Orders:  •  Lipid panel; Future  •  Comprehensive metabolic panel; Future  •  CBC and differential; Future

## 2025-03-12 NOTE — ASSESSMENT & PLAN NOTE
LDL 83 and LFTs normal in August 2024. Continue simvastatin 80 mg qhs. Advised pt to follow a low cholesterol diet and to exercise on a regular basis.   Orders:  •  Lipid panel; Future  •  Comprehensive metabolic panel; Future

## 2025-03-12 NOTE — ASSESSMENT & PLAN NOTE
A1C done today and was 6.0. Continue metformin 500 mg with dinner. Advised pt to continue a low carb diet and to exercise on a regular basis. Foot exam done today. Patient told to schedule eye exam.     Lab Results   Component Value Date    HGBA1C 6.0 03/12/2025     Orders:  •  Albumin / creatinine urine ratio; Future  •  POCT hemoglobin A1c  •  Lipid panel; Future  •  Comprehensive metabolic panel; Future

## 2025-03-12 NOTE — ASSESSMENT & PLAN NOTE
Patient saw Cardiology in April 2023 and had MRI heart in June 2023. Continue metoprolol 25 mg twice a day. Next appointment with Cardiology is in April 2025.

## 2025-04-08 ENCOUNTER — OFFICE VISIT (OUTPATIENT)
Dept: CARDIOLOGY CLINIC | Facility: CLINIC | Age: 75
End: 2025-04-08
Payer: COMMERCIAL

## 2025-04-08 VITALS
HEIGHT: 73 IN | OXYGEN SATURATION: 99 % | WEIGHT: 226.5 LBS | SYSTOLIC BLOOD PRESSURE: 160 MMHG | BODY MASS INDEX: 30.02 KG/M2 | DIASTOLIC BLOOD PRESSURE: 80 MMHG | HEART RATE: 60 BPM

## 2025-04-08 DIAGNOSIS — I10 ESSENTIAL HYPERTENSION: ICD-10-CM

## 2025-04-08 DIAGNOSIS — E78.00 HYPERCHOLESTEROLEMIA: ICD-10-CM

## 2025-04-08 DIAGNOSIS — I42.2 HYPERTROPHIC CARDIOMYOPATHY (HCC): Primary | ICD-10-CM

## 2025-04-08 PROCEDURE — 99215 OFFICE O/P EST HI 40 MIN: CPT | Performed by: INTERNAL MEDICINE

## 2025-05-07 ENCOUNTER — TELEPHONE (OUTPATIENT)
Dept: CARDIOLOGY CLINIC | Facility: CLINIC | Age: 75
End: 2025-05-07

## 2025-05-07 NOTE — TELEPHONE ENCOUNTER
Call placed to patient to remind him of active order for VAS Arterial Duplex of lower limb. No answer, vm left. [facility] central scheduling number provided.

## 2025-07-01 DIAGNOSIS — I42.2 HYPERTROPHIC CARDIOMYOPATHY (HCC): ICD-10-CM

## 2025-07-01 DIAGNOSIS — E11.9 DIABETES MELLITUS WITHOUT COMPLICATION (HCC): ICD-10-CM

## 2025-07-02 DIAGNOSIS — I10 ESSENTIAL HYPERTENSION: ICD-10-CM

## 2025-07-02 RX ORDER — DILTIAZEM HYDROCHLORIDE 240 MG/1
240 CAPSULE, COATED, EXTENDED RELEASE ORAL
Qty: 90 CAPSULE | Refills: 1 | Status: SHIPPED | OUTPATIENT
Start: 2025-07-02

## 2025-07-02 RX ORDER — METOPROLOL TARTRATE 25 MG/1
25 TABLET, FILM COATED ORAL 2 TIMES DAILY
Qty: 180 TABLET | Refills: 1 | Status: SHIPPED | OUTPATIENT
Start: 2025-07-02

## 2025-07-21 DIAGNOSIS — E78.5 HYPERLIPIDEMIA, UNSPECIFIED HYPERLIPIDEMIA TYPE: ICD-10-CM

## 2025-07-21 NOTE — TELEPHONE ENCOUNTER
Reason for call:   [x] Refill   [] Prior Auth  [] Other:     Office:   [x] PCP/Provider - Satish Robles MD / [facility]     [] Specialty/Provider -     Medication: simvastatin (ZOCOR) 80 mg tablet     Dose/Frequency: Take 1 tablet (80 mg total) by mouth daily at bedtime     Quantity: 90    Pharmacy:  [id number] - [place], [place] - [address]   Does the patient have enough for 3 days?   [x] Yes   [] No - Send as HP to POD    Mail Away Pharmacy   Does the patient have enough for 10 days?   [] Yes   [] No - Send as HP to POD

## 2025-07-22 RX ORDER — SIMVASTATIN 80 MG
80 TABLET ORAL
Qty: 90 TABLET | Refills: 1 | Status: SHIPPED | OUTPATIENT
Start: 2025-07-22